# Patient Record
Sex: MALE | Race: WHITE | NOT HISPANIC OR LATINO | Employment: OTHER | URBAN - METROPOLITAN AREA
[De-identification: names, ages, dates, MRNs, and addresses within clinical notes are randomized per-mention and may not be internally consistent; named-entity substitution may affect disease eponyms.]

---

## 2017-03-08 ENCOUNTER — TELEPHONE (OUTPATIENT)
Dept: FAMILY MEDICINE | Facility: CLINIC | Age: 72
End: 2017-03-08

## 2017-03-08 NOTE — TELEPHONE ENCOUNTER
----- Message from Caitlyn Khan sent at 3/7/2017  9:07 AM CST -----  Contact: self  Patient called stating that a prescription for Xarelto 20 mg was suppose to be called to 2 week ago when he was with [facility]    Please call  or cell  to advise.     Thanks

## 2017-03-08 NOTE — TELEPHONE ENCOUNTER
----- Message from Lisbeth Hawk sent at 3/8/2017 12:26 PM CST -----  Pt called stated that he seen the Dr and he changed the blood thinner medication Xarelto 20 mg and stated the pharmacy does not have a enough info/[facility]/pls call back at [phone] /pt stated that this is his 4 message and has not heard from anyone

## 2017-03-09 NOTE — TELEPHONE ENCOUNTER
----- Message from Addie Langston sent at 3/9/2017  9:54 AM CST -----  Contact: Kaiden Livingston samples were given to him. He is wanting to get new script but the pharmacy need authorization. He said he is almost out and put in about 3 or 4 requests. His mail order takes about 4 days.  I advised him well are working on it. Call [phone] on cell or home [phone] when it is done so he will not call back.    [facility] - [address]  [address]  [facility] [address]  Phone: [phone] Fax: [phone]

## 2017-03-10 NOTE — TELEPHONE ENCOUNTER
[employer] DENIED COVERAGE FOR XARELTO, PRESCRIPTION PHONED INTO [facility], PT VERBALIZES UNDERSTANDING

## 2017-03-10 NOTE — TELEPHONE ENCOUNTER
----- Message from Courtney Iglesias sent at 3/10/2017 10:38 AM CST -----  Contact: self  [facility] not been able to get a [facility] for Xarelto. Patient states he will be 5 days short before his appointment on 03/28/17.  Please call [facility] at [phone]. Please call patient at [phone] or [phone]. Thanks!      [facility] - [address]  [address]  [facility] [address]  Phone: [phone] Fax: [phone]

## 2017-03-14 ENCOUNTER — TELEPHONE (OUTPATIENT)
Dept: CARDIOLOGY | Facility: CLINIC | Age: 72
End: 2017-03-14

## 2017-03-16 ENCOUNTER — TELEPHONE (OUTPATIENT)
Dept: CARDIOLOGY | Facility: CLINIC | Age: 72
End: 2017-03-16

## 2017-03-16 NOTE — TELEPHONE ENCOUNTER
Prescription in appeals, should be filled by early next week, pt verbalizes understanding, pt given direct contact info for [facility].

## 2017-03-16 NOTE — TELEPHONE ENCOUNTER
----- Message from Courtney Iglesias sent at 3/16/2017 11:32 AM CDT -----  Contact: self  Patient is calling back regarding his prescription of Xarelto. Insurance is refusing to pay for medication and he is almost out of the samples. Please call patient at [phone] or [phone]. Thanks!

## 2017-03-23 ENCOUNTER — OFFICE VISIT (OUTPATIENT)
Dept: CARDIOLOGY | Facility: CLINIC | Age: 72
End: 2017-03-23
Payer: MEDICARE

## 2017-03-23 VITALS
HEART RATE: 65 BPM | SYSTOLIC BLOOD PRESSURE: 116 MMHG | HEIGHT: 68 IN | WEIGHT: 201.94 LBS | BODY MASS INDEX: 30.61 KG/M2 | DIASTOLIC BLOOD PRESSURE: 65 MMHG

## 2017-03-23 DIAGNOSIS — I48.19 PERSISTENT ATRIAL FIBRILLATION: Primary | ICD-10-CM

## 2017-03-23 DIAGNOSIS — I50.1 LEFT HEART FAILURE: ICD-10-CM

## 2017-03-23 PROBLEM — I25.810 CORONARY ARTERY DISEASE INVOLVING CORONARY BYPASS GRAFT OF NATIVE HEART WITHOUT ANGINA PECTORIS: Status: ACTIVE | Noted: 2017-03-23

## 2017-03-23 PROBLEM — I05.9 MITRAL VALVE DISEASE: Status: ACTIVE | Noted: 2017-03-23

## 2017-03-23 PROBLEM — I66.9: Status: ACTIVE | Noted: 2017-03-23

## 2017-03-23 PROCEDURE — 99999 PR PBB SHADOW E&M-EST. PATIENT-LVL III: CPT | Mod: PBBFAC,,,

## 2017-03-23 PROCEDURE — 99202 OFFICE O/P NEW SF 15 MIN: CPT | Mod: S$PBB,,, | Performed by: INTERNAL MEDICINE

## 2017-03-23 PROCEDURE — 99213 OFFICE O/P EST LOW 20 MIN: CPT | Mod: PBBFAC,PO | Performed by: INTERNAL MEDICINE

## 2017-03-23 RX ORDER — ASPIRIN 81 MG/1
81 TABLET ORAL NIGHTLY
Status: ON HOLD | COMMUNITY
End: 2018-01-18 | Stop reason: HOSPADM

## 2017-03-23 NOTE — PROGRESS NOTES
Subjective:    Patient ID:  Kaiden Forde is a 71 y.o. male who presents for Atrial Fibrillation and Congestive Heart Failure  CAD    HPI DISCUSSED LABS, CR 1.20. , DOING OK, NO TACHYCARDIA, SEE ROS    Past Medical History:   Diagnosis Date    Acute coronary syndrome     Atrial fibrillation     Cardiomyopathy     Carotid artery occlusion     CHF (congestive heart failure)     Coronary artery disease     Heart murmur     Hyperlipidemia     Hypertension     Hypothyroidism     Sleep apnea     Valvular regurgitation      Past Surgical History:   Procedure Laterality Date    CARDIAC CATHETERIZATION      CAROTID ENDARTERECTOMY      CORONARY ANGIOPLASTY      CORONARY ARTERY BYPASS GRAFT      CORONARY STENT PLACEMENT      KNEE SURGERY      SHOULDER SURGERY Left      Family History   Problem Relation Age of Onset    Heart disease Brother      Social History     Social History    Marital status:      Spouse name: N/A    Number of children: N/A    Years of education: N/A     Social History Main Topics    Smoking status: Former Smoker     Quit date: 3/11/2001    Smokeless tobacco: Never Used    Alcohol use No    Drug use: No    Sexual activity: No     Other Topics Concern    None     Social History Narrative       Review of patient's allergies indicates:  No Known Allergies    Current Outpatient Prescriptions:     aspirin (ECOTRIN) 81 MG EC tablet, Take 81 mg by mouth once daily., Disp: , Rfl:     atorvastatin (LIPITOR) 80 MG tablet, Take 1 tablet by mouth once daily., Disp: , Rfl:     benazepril (LOTENSIN) 20 MG tablet, Take 1 tablet by mouth once daily., Disp: , Rfl:     carvedilol (COREG) 12.5 MG tablet, , Disp: , Rfl:     furosemide (LASIX) 40 MG tablet, Take 40 mg by mouth 2 (two) times daily., Disp: , Rfl:     isosorbide mononitrate (IMDUR) 30 MG 24 hr tablet, Take 1 tablet by mouth once daily., Disp: , Rfl:     levothyroxine (SYNTHROID) 50 MCG tablet, Take 1 tablet by  mouth once daily, Disp: 90 tablet, Rfl: 1    metformin (GLUCOPHAGE) 1000 MG tablet, Take 1 tablet by mouth two  times daily, Disp: 180 tablet, Rfl: 1    mv-min-folic acid-lutein (CENTRUM SILVER) 400-250 mcg Chew, Take by mouth., Disp: , Rfl:     nitroGLYCERIN 0.2 mg/hr TD PT24 (NITRODUR) 0.2 mg/hr, , Disp: , Rfl:     omeprazole (PRILOSEC) 20 MG capsule, Take 20 mg by mouth once daily., Disp: , Rfl:     PANTOTHENIC ACID ORAL, Take 1 tablet by mouth., Disp: , Rfl:     potassium chloride (KLOR-CON) 10 MEQ TbSR, Take 1 tablet by mouth once daily., Disp: , Rfl:     rivaroxaban (XARELTO) 20 mg Tab, Take 1 tablet (20 mg total) by mouth once daily., Disp: 180 tablet, Rfl: 1    zinc 50 mg Tab, Take 1 tablet by mouth., Disp: , Rfl:     Review of Systems   Constitution: Negative for chills, diaphoresis, weakness, malaise/fatigue, night sweats and weight gain.   HENT: Negative for congestion.    Eyes: Negative for blurred vision, discharge and visual disturbance.   Cardiovascular: Positive for irregular heartbeat. Negative for chest pain, claudication, cyanosis, leg swelling, near-syncope, orthopnea, palpitations, paroxysmal nocturnal dyspnea and syncope. Dyspnea on exertion: MILD.   Respiratory: Negative for cough, hemoptysis, shortness of breath, sleep disturbances due to breathing, sputum production and wheezing.    Endocrine: Negative for cold intolerance, heat intolerance, polyphagia and polyuria.   Hematologic/Lymphatic: Negative for adenopathy. Does not bruise/bleed easily.   Skin: Negative for color change, itching and nail changes.   Musculoskeletal: Positive for stiffness. Negative for back pain and falls.   Gastrointestinal: Negative for bloating, abdominal pain, constipation, dysphagia, flatus, heartburn, hematemesis, jaundice and melena.   Genitourinary: Negative for dysuria, frequency, hematuria, incomplete emptying and nocturia.   Neurological: Negative for brief paralysis, difficulty with concentration,  "dizziness, focal weakness, light-headedness, loss of balance, numbness, paresthesias and tremors.   Psychiatric/Behavioral: Negative for altered mental status and substance abuse. The patient is not nervous/anxious.    Allergic/Immunologic: Negative for persistent infections.        Objective:      Vitals:    03/23/17 1408   BP: 116/65   Pulse: 65   Weight: 91.6 kg (201 lb 15.1 oz)   Height: 5' 8" (1.727 m)     Body mass index is 30.71 kg/(m^2).    Physical Exam   Constitutional: He is oriented to person, place, and time. He appears well-developed and well-nourished.   OVERWEIGHT   HENT:   Head: Normocephalic and atraumatic.   Mouth/Throat: Oropharynx is clear and moist.   Eyes: EOM are normal. Pupils are equal, round, and reactive to light.   Neck: Neck supple. Normal carotid pulses and no hepatojugular reflux present. Carotid bruit is present. No tracheal deviation present. No thyromegaly present.   Cardiovascular: Normal rate.  An irregularly irregular rhythm present. Exam reveals no gallop, no distant heart sounds, no friction rub and no midsystolic click.    Murmur heard.  High-pitched blowing holosystolic murmur is present with a grade of 2/6  at the apex   Early diastolic murmur is present   Pulses:       Carotid pulses are 2+ on the right side, and 2+ on the left side.       Radial pulses are 2+ on the right side, and 2+ on the left side.        Femoral pulses are 2+ on the right side, and 2+ on the left side.       Popliteal pulses are 2+ on the right side, and 2+ on the left side.        Dorsalis pedis pulses are 2+ on the right side, and 2+ on the left side.        Posterior tibial pulses are 2+ on the right side, and 2+ on the left side.   Pulmonary/Chest: Effort normal and breath sounds normal.   STERNOTOMY SCAR   Abdominal: Soft. Bowel sounds are normal. He exhibits no distension and no mass. There is no hepatosplenomegaly. There is no tenderness. There is no CVA tenderness.   Musculoskeletal: Normal " range of motion. He exhibits no edema.   Lymphadenopathy:     He has no cervical adenopathy.   Neurological: He is alert and oriented to person, place, and time. He displays no tremor. No cranial nerve deficit. Coordination normal.   Skin: Skin is warm and dry. No erythema.   Psychiatric: He has a normal mood and affect. His speech is normal and behavior is normal. Judgment and thought content normal.               ..    Chemistry    No results found for: NA, K, CL, CO2, BUN, CREATININE, GLU No results found for: CALCIUM, ALKPHOS, AST, ALT, BILITOT         ..No results found for: CHOL  No results found for: HDL  No results found for: LDLCALC  No results found for: TRIG  No results found for: CHOLHDL  ..No results found for: WBC, HGB, HCT, MCV, PLT    Test(s) Reviewed  I have reviewed the following in detail:  [] Stress test   [] Angiography   [] Echocardiogram   [x] Labs   [] Other:       Assessment:         ICD-10-CM ICD-9-CM   1. Persistent atrial fibrillation I48.1 427.31   2. Left heart failure I50.1 428.1     Problem List Items Addressed This Visit        Cardiology Problems    Persistent atrial fibrillation - Primary    Left heart failure           Plan:         DC PLAVIX, STARTED XARELTO, HR APPEARS OK, WILL CHECK HOLTER WITH AMBULATION, DIET, DAILY WEIGHT, EDUCATION,NO AMNGINA, NO TIA, RTC IN FEW WEEKS    Persistent atrial fibrillation    Left heart failure  RTC Low level/low impact aerobic exercise 5x's/wk. Heart healthy diet and risk factor modification.    See labs and med orders.    Aerobic exercise 5x's/wk. Heart healthy diet and risk factor modification.    See labs and med orders.

## 2017-03-23 NOTE — MR AVS SNAPSHOT
[facility]  [address] [facility] [address]  [place] [address]  Phone: [phone]                  Kaiden Forde   3/23/2017 2:00 PM   Office Visit    Description:  Male : 1945   Provider:  Yuri Toure MD   Department:  [facility]           Reason for Visit     Atrial Fibrillation     Congestive Heart Failure           Diagnoses this Visit        Comments    Persistent atrial fibrillation    -  Primary     Left heart failure                To Do List           Goals (5 Years of Data)     None      [facility]     [facility] On Call Nurse Care Line -  Assistance  Registered nurses in the [facility] On Call Center provide clinical advisement, health education, appointment booking, and other advisory services.  Call for this free service at [phone].             Medications           Message regarding Medications     Verify the changes and/or additions to your medication regime listed below are the same as discussed with your clinician today.  If any of these changes or additions are incorrect, please notify your healthcare provider.        STOP taking these medications     ZETIA 10 mg tablet Take 1 tablet by mouth once daily.    clopidogrel (PLAVIX) 75 mg tablet Take 1 tablet by mouth once daily.           Verify that the below list of medications is an accurate representation of the medications you are currently taking.  If none reported, the list may be blank. If incorrect, please contact your healthcare provider. Carry this list with you in case of emergency.           Current Medications     aspirin (ECOTRIN) 81 MG EC tablet Take 81 mg by mouth once daily.    atorvastatin (LIPITOR) 80 MG tablet Take 1 tablet by mouth once daily.    benazepril (LOTENSIN) 20 MG tablet Take 1 tablet by mouth once daily.    carvedilol (COREG) 12.5 MG tablet     furosemide (LASIX) 40 MG tablet Take 40 mg by mouth 2 (two) times daily.    isosorbide mononitrate (IMDUR) 30 MG 24 hr tablet Take  "1 tablet by mouth once daily.    levothyroxine (SYNTHROID) 50 MCG tablet Take 1 tablet by mouth once daily    metformin (GLUCOPHAGE) 1000 MG tablet Take 1 tablet by mouth two  times daily    mv-min-folic acid-lutein (CENTRUM SILVER) 400-250 mcg Chew Take by mouth.    nitroGLYCERIN 0.2 mg/hr TD PT24 (NITRODUR) 0.2 mg/hr     omeprazole (PRILOSEC) 20 MG capsule Take 20 mg by mouth once daily.    PANTOTHENIC ACID ORAL Take 1 tablet by mouth.    potassium chloride (KLOR-CON) 10 MEQ TbSR Take 1 tablet by mouth once daily.    rivaroxaban (XARELTO) 20 mg Tab Take 1 tablet (20 mg total) by mouth once daily.    zinc 50 mg Tab Take 1 tablet by mouth.           Clinical Reference Information           Your Vitals Were     BP Pulse Height Weight BMI    116/65 (BP Location: Left arm, Patient Position: Sitting, BP Method: Automatic) 65 5' 8" (1.727 m) 91.6 kg (201 lb 15.1 oz) 30.71 kg/m2      Blood Pressure          Most Recent Value    BP  116/65      Allergies as of 3/23/2017     No Known Allergies      Immunizations Administered on Date of Encounter - 3/23/2017     None      [facility] Sign-Up     Activating your [facility] account is as easy as 1-2-3!     1) Visit [email or web address], select Sign Up Now, enter this activation code and your date of birth, then select Next.  [email or web address]  Expires: 5/7/2017  2:37 PM      2) Create a username and password to use when you visit [facility] in the future and select a security question in case you lose your password and select Next.    3) Enter your e-mail address and click Sign Up!    Additional Information  If you have questions, please e-mail [email or web address] or call [phone] to talk to our [facility] staff. Remember, [facility] is NOT to be used for urgent needs. For medical emergencies, dial 911.         Instructions      Understanding Atrial Fibrillation    An arrhythmia is any problem with the speed or pattern of the heartbeat. Atrial fibrillation (AFib) is a common type of " arrhythmia. It causes fast, chaotic electrical signals in the atria. This leads to poor functioning of the heart. It also affects how much blood your heart can pump out to the body.  Afib may occur once in a while and go away on its own. Or it may continue for longer periods and need treatment.  AFib can lead to serious problems, such as stroke. Your healthcare provider will need to monitor and manage it.  What happens during atrial fibrillation?   The heart has an electrical system that sends signals to control the heartbeat. As signals move through the heart, they tell the hearts upper chambers (atria) and lower chambers (ventricles) when to squeeze (contract) and relax. This lets blood move through the heart and out to the body and lungs.  With AFib, the atria receive abnormal signals. This causes them to contract in a fast and irregular way, and out of sync with the ventricles. When this happens, the atria also have a harder time moving blood into the ventricles. Blood may then pool in the atria, which increases the risk for blood clots and stroke. The ventricles also may contract too quickly and irregularly. As a result, they may not pump blood to the body and lungs as well as they should. This can weaken the heart muscle over time and cause heart failure.  What causes atrial fibrillation?  AFib is more common in older adults. It has many possible causes including:  · Coronary artery disease  · Heart valve disease  · Heart attack  · Heart surgery  · High blood pressure  · Thyroid disease  · Diabetes  · Lung disease  · Sleep apnea  · Heavy alcohol use  In some cases of AFib, doctors do not know the cause.  What are the symptoms of atrial fibrillation?  AFib may or may not cause symptoms. If symptoms do occur, they may include:  · A fast, pounding, irregular heartbeat  · Shortness of breath  · Tiredness  · Dizziness or fainting  · Chest pain  How is atrial fibrillation treated?  Treatments for AFib can include  any of the options below.  · Medicines. You may be prescribed:  ¨ Heart rate medicines to help slow down the heartbeat  ¨ Heart rhythm medicines to help the heart beat more regularly  ¨ Anti-clotting medicines to help reduce the risk for blood clots and stroke  · Electrical cardioversion. Your healthcare provider uses special pads or paddles to send one or more brief electrical shocks to the heart. This can help reset the heartbeat to normal.  · Ablation. Long, thin tubes called catheters are threaded through a blood vessel to the heart. There, the catheters send out hot or cold energy to the areas causing the abnormal signals. This energy destroys the problem tissue or cells. This improves the chances that your heart will stay in normal rhythm without using medicines. If your heart rate and rhythm cant be controlled, you may need ablation and a pacemaker. These will help control the heart rate and regularity of the heartbeat.  · Surgery. During surgery, your healthcare provider may use different methods to create scar tissue in the areas of the heart causing the abnormal signals. The scar tissue disrupts the abnormal signals and may stop AFib from occurring.  What are the complications of atrial fibrillation?  These can include:  · Blood clots  · Stroke  · Heart failure. This problem occurs when the heart muscle weakens so much that it can no longer pump blood well.  When should I call my healthcare provider?  Call your healthcare provider right away if you have any of these:  · Symptoms that dont get better with treatment, or get worse  · New symptoms   Date Last Reviewed: 5/1/2016  © 1020-0986 [employer]. [address] [place] [address], [place] [address]. All rights reserved. This information is not intended as a substitute for professional medical care. Always follow your healthcare professional's instructions.        Left-Sided Congestive Heart Failure    The heart is a large muscle that pumps blood  throughout the body. Blood carries oxygen to all the organs (including the brain), muscles, and skin of your body. After the body takes the oxygen out of the blood, the blood returns to the heart. The right side of the heart collects that blood and pumps it to the lungs to receive fresh oxygen. This oxygen-rich blood from the lungs then returns to the left side of the heart, where it is pumped back out to the brain and rest of the body, starting the process all over.   Heart failure occurs when the heart muscle is weakened. This can occur after a heart attack or with significant coronary artery disease. This affects the pumping action of the heart.   When the left side of the heart is weakened, it cant handle the blood it is getting from the lungs. Pressure then builds up in the veins of the lungs, causing fluid to leak into the lung tissues. This may be referred to as congestive heart failure. This causes you to feel short of breath, weak, or dizzy. These symptoms are often worse with exertion, such as climbing stairs or walking up hills. Lying flat is uncomfortable and can make your breathing worse. This may make sleeping difficult and force you to use extra pillows to elevate your upper body to help you sleep well.  Causes of heart failure include:  · Coronary artery disease  · Heart attack in the past (also known as acute myocardial infarction, or AMI)  · High blood pressure  · Damaged heart valve  · Diabetes  · Obesity  · Cigarette smoking  · Alcohol abuse  Treatment  Heart failure is a chronic condition. There is no cure. The purpose of medical treatment is to improve the pumping action of the heart, and remove excess water and fluids from the body. A number of medicines can help reach this goal, improve symptoms and keep the heart from becoming weaker. In some cases of severe heart failure, a mechanical device will be placed in the heart to help the heart pump. Another major goal is to better treat the  causes of heart failure, such as diabetes, high blood pressure, and your lifestyle.  Home care  · Check your weight every day. A sudden increase in weight gain could mean worsening heart failure.  ¨ Use the same scale every day.  ¨ Weigh yourself at the same time every day.  ¨ Make sure the scale is on the floor, not on a rug.  ¨ Keep a record of your weight every day, so your healthcare provider can see it. If you are not given a log sheet for this, keep a separate journal for this purpose.   · Cut back on how much salt (sodium) you eat:  ¨ Your provider will tell you how much salt to have daily, usually 2,000 mg or less.  ¨ Avoid high-salt foods. These include olives, pickles, smoked meats, processed foods, and salted potato chips.  ¨ Don't add salt to your food at the table. Use only small amounts of salt when cooking.  ¨ Avoid binging on salt-heavy meals.  · Follow your healthcare provider's recommendations about how much fluid you should have.  · Stop smoking.  · Cut back on the amount of alcohol you drink.  · Lose weight if you are overweight. The excess weight adds a lot of stress on the workload of the heart.  · Stay active. Talk to your provider about an exercise program that is safe for your heart.  · Keep your feet elevated to reduce swelling. Ask your provider about support hose as a preventive treatment for daytime leg swelling.  · Follow your healthcare provider's instructions closely.  Besides taking your medicine as instructed, an important part of treatment includes lifestyle changes. These include diet, physical activity, stopping smoking, and weight control.  Improve your diet. Often in the hospital, people are given a heart healthy diet. This includes more fresh foods, lower saturated fat, less processed foods, and lower salt.  Follow-up care  Follow up with your healthcare provider, or as advised. Make sure to keep any appointments that were made for you. This can help better control heart  failure.  If an X-ray was done, you will be told of any new findings that may affect your care.  Call 911  Call 911 if you:  · Become severely short of breath  · Feel lightheaded, or feel like you might pass out or faint  · Have chest pain or discomfort that is different than usual, the medicines your provider told you to use for this don't help, or the pain lasts longer than 10 to 15 minutes  · Develop a rapid heart rate suddenly  When to seek medical advice  Call your healthcare provider right away if you have any of these signs of worsening heart failure:  · Sudden weight gain --  more than 2 pounds in 1 day, or 5 pounds in 1 week, or whatever weight gain you were told to report by your provider  · Trouble breathing not related to being active  · New or increased swelling of your legs or ankles  · Swelling or pain in your abdomen  · Breathing trouble at night, waking up short of breath or needing more pillows to elevate your upper body to help you breathe  · Frequent coughing that doesnt go away  · Feeling much more tired than usual  Date Last Reviewed: 1/4/2016  © 3082-3755 [employer]. [address] [place] [address]. All rights reserved. This information is not intended as a substitute for professional medical care. Always follow your healthcare professional's instructions.             Language Assistance Services     ATTENTION: Language assistance services are available, free of charge. Please call [phone].      ATENCIÓN: Si habla español, tiene a mar disposición servicios gratuitos de asistencia lingüística. Llame al [phone].     [facility] Ý: N?u b?n nói Ti?ng Vi?t, có các d?ch v? h? tr? ngôn ng? mi?n phí dành cho b?n. G?i s? [phone].         [facility] complies with applicable Federal civil rights laws and does not discriminate on the basis of race, color, national origin, age, disability, or sex.

## 2017-03-23 NOTE — PATIENT INSTRUCTIONS
Understanding Atrial Fibrillation    An arrhythmia is any problem with the speed or pattern of the heartbeat. Atrial fibrillation (AFib) is a common type of arrhythmia. It causes fast, chaotic electrical signals in the atria. This leads to poor functioning of the heart. It also affects how much blood your heart can pump out to the body.  Afib may occur once in a while and go away on its own. Or it may continue for longer periods and need treatment.  AFib can lead to serious problems, such as stroke. Your healthcare provider will need to monitor and manage it.  What happens during atrial fibrillation?   The heart has an electrical system that sends signals to control the heartbeat. As signals move through the heart, they tell the hearts upper chambers (atria) and lower chambers (ventricles) when to squeeze (contract) and relax. This lets blood move through the heart and out to the body and lungs.  With AFib, the atria receive abnormal signals. This causes them to contract in a fast and irregular way, and out of sync with the ventricles. When this happens, the atria also have a harder time moving blood into the ventricles. Blood may then pool in the atria, which increases the risk for blood clots and stroke. The ventricles also may contract too quickly and irregularly. As a result, they may not pump blood to the body and lungs as well as they should. This can weaken the heart muscle over time and cause heart failure.  What causes atrial fibrillation?  AFib is more common in older adults. It has many possible causes including:  · Coronary artery disease  · Heart valve disease  · Heart attack  · Heart surgery  · High blood pressure  · Thyroid disease  · Diabetes  · Lung disease  · Sleep apnea  · Heavy alcohol use  In some cases of AFib, doctors do not know the cause.  What are the symptoms of atrial fibrillation?  AFib may or may not cause symptoms. If symptoms do occur, they may include:  · A fast, pounding,  irregular heartbeat  · Shortness of breath  · Tiredness  · Dizziness or fainting  · Chest pain  How is atrial fibrillation treated?  Treatments for AFib can include any of the options below.  · Medicines. You may be prescribed:  ¨ Heart rate medicines to help slow down the heartbeat  ¨ Heart rhythm medicines to help the heart beat more regularly  ¨ Anti-clotting medicines to help reduce the risk for blood clots and stroke  · Electrical cardioversion. Your healthcare provider uses special pads or paddles to send one or more brief electrical shocks to the heart. This can help reset the heartbeat to normal.  · Ablation. Long, thin tubes called catheters are threaded through a blood vessel to the heart. There, the catheters send out hot or cold energy to the areas causing the abnormal signals. This energy destroys the problem tissue or cells. This improves the chances that your heart will stay in normal rhythm without using medicines. If your heart rate and rhythm cant be controlled, you may need ablation and a pacemaker. These will help control the heart rate and regularity of the heartbeat.  · Surgery. During surgery, your healthcare provider may use different methods to create scar tissue in the areas of the heart causing the abnormal signals. The scar tissue disrupts the abnormal signals and may stop AFib from occurring.  What are the complications of atrial fibrillation?  These can include:  · Blood clots  · Stroke  · Heart failure. This problem occurs when the heart muscle weakens so much that it can no longer pump blood well.  When should I call my healthcare provider?  Call your healthcare provider right away if you have any of these:  · Symptoms that dont get better with treatment, or get worse  · New symptoms   Date Last Reviewed: 5/1/2016  © 9614-9535 [employer]. [address] [place] [address], [place] [address]. All rights reserved. This information is not intended as a substitute for professional  medical care. Always follow your healthcare professional's instructions.        Left-Sided Congestive Heart Failure    The heart is a large muscle that pumps blood throughout the body. Blood carries oxygen to all the organs (including the brain), muscles, and skin of your body. After the body takes the oxygen out of the blood, the blood returns to the heart. The right side of the heart collects that blood and pumps it to the lungs to receive fresh oxygen. This oxygen-rich blood from the lungs then returns to the left side of the heart, where it is pumped back out to the brain and rest of the body, starting the process all over.   Heart failure occurs when the heart muscle is weakened. This can occur after a heart attack or with significant coronary artery disease. This affects the pumping action of the heart.   When the left side of the heart is weakened, it cant handle the blood it is getting from the lungs. Pressure then builds up in the veins of the lungs, causing fluid to leak into the lung tissues. This may be referred to as congestive heart failure. This causes you to feel short of breath, weak, or dizzy. These symptoms are often worse with exertion, such as climbing stairs or walking up hills. Lying flat is uncomfortable and can make your breathing worse. This may make sleeping difficult and force you to use extra pillows to elevate your upper body to help you sleep well.  Causes of heart failure include:  · Coronary artery disease  · Heart attack in the past (also known as acute myocardial infarction, or AMI)  · High blood pressure  · Damaged heart valve  · Diabetes  · Obesity  · Cigarette smoking  · Alcohol abuse  Treatment  Heart failure is a chronic condition. There is no cure. The purpose of medical treatment is to improve the pumping action of the heart, and remove excess water and fluids from the body. A number of medicines can help reach this goal, improve symptoms and keep the heart from becoming  weaker. In some cases of severe heart failure, a mechanical device will be placed in the heart to help the heart pump. Another major goal is to better treat the causes of heart failure, such as diabetes, high blood pressure, and your lifestyle.  Home care  · Check your weight every day. A sudden increase in weight gain could mean worsening heart failure.  ¨ Use the same scale every day.  ¨ Weigh yourself at the same time every day.  ¨ Make sure the scale is on the floor, not on a rug.  ¨ Keep a record of your weight every day, so your healthcare provider can see it. If you are not given a log sheet for this, keep a separate journal for this purpose.   · Cut back on how much salt (sodium) you eat:  ¨ Your provider will tell you how much salt to have daily, usually 2,000 mg or less.  ¨ Avoid high-salt foods. These include olives, pickles, smoked meats, processed foods, and salted potato chips.  ¨ Don't add salt to your food at the table. Use only small amounts of salt when cooking.  ¨ Avoid binging on salt-heavy meals.  · Follow your healthcare provider's recommendations about how much fluid you should have.  · Stop smoking.  · Cut back on the amount of alcohol you drink.  · Lose weight if you are overweight. The excess weight adds a lot of stress on the workload of the heart.  · Stay active. Talk to your provider about an exercise program that is safe for your heart.  · Keep your feet elevated to reduce swelling. Ask your provider about support hose as a preventive treatment for daytime leg swelling.  · Follow your healthcare provider's instructions closely.  Besides taking your medicine as instructed, an important part of treatment includes lifestyle changes. These include diet, physical activity, stopping smoking, and weight control.  Improve your diet. Often in the hospital, people are given a heart healthy diet. This includes more fresh foods, lower saturated fat, less processed foods, and lower salt.  Follow-up  care  Follow up with your healthcare provider, or as advised. Make sure to keep any appointments that were made for you. This can help better control heart failure.  If an X-ray was done, you will be told of any new findings that may affect your care.  Call 911  Call 911 if you:  · Become severely short of breath  · Feel lightheaded, or feel like you might pass out or faint  · Have chest pain or discomfort that is different than usual, the medicines your provider told you to use for this don't help, or the pain lasts longer than 10 to 15 minutes  · Develop a rapid heart rate suddenly  When to seek medical advice  Call your healthcare provider right away if you have any of these signs of worsening heart failure:  · Sudden weight gain --  more than 2 pounds in 1 day, or 5 pounds in 1 week, or whatever weight gain you were told to report by your provider  · Trouble breathing not related to being active  · New or increased swelling of your legs or ankles  · Swelling or pain in your abdomen  · Breathing trouble at night, waking up short of breath or needing more pillows to elevate your upper body to help you breathe  · Frequent coughing that doesnt go away  · Feeling much more tired than usual  Date Last Reviewed: 1/4/2016  © 5441-6341 [employer] [address] [place] [address], [place] [address]. All rights reserved. This information is not intended as a substitute for professional medical care. Always follow your healthcare professional's instructions.

## 2017-03-24 ENCOUNTER — TELEPHONE (OUTPATIENT)
Dept: CARDIOLOGY | Facility: CLINIC | Age: 72
End: 2017-03-24

## 2017-03-24 NOTE — TELEPHONE ENCOUNTER
----- Message from Kylie Lopez sent at 3/24/2017  8:11 AM CDT -----  Contact: self  Needs to verify which medication he was taken off of yesterday. Please call back at  or .

## 2017-04-06 RX ORDER — BENAZEPRIL HYDROCHLORIDE 20 MG/1
TABLET ORAL
Qty: 90 TABLET | Refills: 1 | Status: SHIPPED | OUTPATIENT
Start: 2017-04-06 | End: 2017-11-04 | Stop reason: SDUPTHER

## 2017-04-06 RX ORDER — POTASSIUM CHLORIDE 750 MG/1
TABLET, EXTENDED RELEASE ORAL
Qty: 45 TABLET | Refills: 1 | Status: SHIPPED | OUTPATIENT
Start: 2017-04-06 | End: 2017-11-04 | Stop reason: SDUPTHER

## 2017-04-06 RX ORDER — EZETIMIBE 10 MG
TABLET ORAL
Qty: 90 TABLET | Refills: 1 | Status: SHIPPED | OUTPATIENT
Start: 2017-04-06 | End: 2017-04-27

## 2017-04-11 ENCOUNTER — CLINICAL SUPPORT (OUTPATIENT)
Dept: CARDIOLOGY | Facility: CLINIC | Age: 72
End: 2017-04-11
Payer: MEDICARE

## 2017-04-11 DIAGNOSIS — I50.1 LEFT HEART FAILURE: ICD-10-CM

## 2017-04-11 DIAGNOSIS — I48.19 PERSISTENT ATRIAL FIBRILLATION: ICD-10-CM

## 2017-04-11 PROCEDURE — 93226 XTRNL ECG REC<48 HR SCAN A/R: CPT | Mod: PBBFAC,PO | Performed by: INTERNAL MEDICINE

## 2017-04-11 PROCEDURE — 93227 XTRNL ECG REC<48 HR R&I: CPT | Mod: S$PBB,,, | Performed by: INTERNAL MEDICINE

## 2017-04-19 ENCOUNTER — TELEPHONE (OUTPATIENT)
Dept: CARDIOLOGY | Facility: CLINIC | Age: 72
End: 2017-04-19

## 2017-04-19 NOTE — TELEPHONE ENCOUNTER
Pt states that Dr Toure had DC'd Isosorbide this last year, when he increased NTG patch, questioning whether he should still be taking, please advise

## 2017-04-19 NOTE — TELEPHONE ENCOUNTER
----- Message from Beatriz Osuna sent at 4/19/2017  9:09 AM CDT -----  Contact: self  Patient wants to speak with a nurse regarding his medication list. Please call back at [phone] (home)

## 2017-04-27 ENCOUNTER — OFFICE VISIT (OUTPATIENT)
Dept: CARDIOLOGY | Facility: CLINIC | Age: 72
End: 2017-04-27
Payer: MEDICARE

## 2017-04-27 VITALS
HEIGHT: 68 IN | DIASTOLIC BLOOD PRESSURE: 68 MMHG | HEART RATE: 72 BPM | SYSTOLIC BLOOD PRESSURE: 127 MMHG | BODY MASS INDEX: 29.8 KG/M2 | WEIGHT: 196.63 LBS

## 2017-04-27 DIAGNOSIS — I48.19 PERSISTENT ATRIAL FIBRILLATION: Primary | ICD-10-CM

## 2017-04-27 DIAGNOSIS — Z79.01 LONG TERM (CURRENT) USE OF ANTICOAGULANTS: ICD-10-CM

## 2017-04-27 DIAGNOSIS — I25.10 CORONARY ARTERY DISEASE INVOLVING NATIVE CORONARY ARTERY OF NATIVE HEART WITHOUT ANGINA PECTORIS: ICD-10-CM

## 2017-04-27 DIAGNOSIS — I50.1 LEFT HEART FAILURE: ICD-10-CM

## 2017-04-27 PROCEDURE — 99999 PR PBB SHADOW E&M-EST. PATIENT-LVL III: CPT | Mod: PBBFAC,,, | Performed by: INTERNAL MEDICINE

## 2017-04-27 PROCEDURE — 99214 OFFICE O/P EST MOD 30 MIN: CPT | Mod: S$PBB,,, | Performed by: INTERNAL MEDICINE

## 2017-04-27 PROCEDURE — 99213 OFFICE O/P EST LOW 20 MIN: CPT | Mod: PBBFAC,PO | Performed by: INTERNAL MEDICINE

## 2017-04-27 RX ORDER — NITROGLYCERIN 0.4 MG/1
0.4 TABLET SUBLINGUAL EVERY 5 MIN PRN
Qty: 25 TABLET | Refills: 0 | Status: SHIPPED | OUTPATIENT
Start: 2017-04-27 | End: 2018-01-08 | Stop reason: SDUPTHER

## 2017-04-27 RX ORDER — SODIUM CHLORIDE 9 MG/ML
INJECTION, SOLUTION INTRAVENOUS ONCE
Status: CANCELLED | OUTPATIENT
Start: 2017-04-27 | End: 2017-04-27

## 2017-04-27 NOTE — PROGRESS NOTES
Subjective:    Patient ID:  Kaiden Forde is a 71 y.o. male who presents for Follow-up (holter)  a fib    HPI  DISCUSSED HOLTER , A FIB, CONTROLLED RATE, LONGEST RR 2.3 SECONDS, STATES FEELS BAD AT TIMES WITH IRREGULAR HEART BEATS, FEELS BETTER WITH TOMATO JUICE ??, SEE ROS  Past Medical History:   Diagnosis Date    Acute coronary syndrome     Atrial fibrillation     Cardiomyopathy     Carotid artery occlusion     CHF (congestive heart failure)     Coronary artery disease     Heart murmur     Hyperlipidemia     Hypertension     Hypothyroidism     Sleep apnea     Valvular regurgitation      Past Surgical History:   Procedure Laterality Date    CARDIAC CATHETERIZATION      CAROTID ENDARTERECTOMY      CORONARY ANGIOPLASTY      CORONARY ARTERY BYPASS GRAFT      CORONARY STENT PLACEMENT      KNEE SURGERY      SHOULDER SURGERY Left      Family History   Problem Relation Age of Onset    Heart disease Brother      Social History     Social History    Marital status:      Spouse name: N/A    Number of children: N/A    Years of education: N/A     Social History Main Topics    Smoking status: Former Smoker     Quit date: 3/11/2001    Smokeless tobacco: Never Used    Alcohol use No    Drug use: No    Sexual activity: No     Other Topics Concern    Not on file     Social History Narrative       Review of patient's allergies indicates:  No Known Allergies    Current Outpatient Prescriptions:     aspirin (ECOTRIN) 81 MG EC tablet, Take 81 mg by mouth once daily., Disp: , Rfl:     atorvastatin (LIPITOR) 80 MG tablet, Take 1 tablet by mouth once daily., Disp: , Rfl:     benazepril (LOTENSIN) 20 MG tablet, Take 1 tablet by mouth  daily, Disp: 90 tablet, Rfl: 1    carvedilol (COREG) 12.5 MG tablet, , Disp: , Rfl:     furosemide (LASIX) 40 MG tablet, Take 40 mg by mouth 2 (two) times daily., Disp: , Rfl:     levothyroxine (SYNTHROID) 50 MCG tablet, Take 1 tablet by mouth once daily, Disp:  90 tablet, Rfl: 1    metformin (GLUCOPHAGE) 1000 MG tablet, Take 1 tablet by mouth two  times daily, Disp: 180 tablet, Rfl: 1    mv-min-folic acid-lutein (CENTRUM SILVER) 400-250 mcg Chew, Take by mouth., Disp: , Rfl:     nitroGLYCERIN 0.2 mg/hr TD PT24 (NITRODUR) 0.2 mg/hr, , Disp: , Rfl:     potassium chloride (KLOR-CON) 10 MEQ TbSR, Take 1 tablet by mouth  every other day, Disp: 45 tablet, Rfl: 1    rivaroxaban (XARELTO) 20 mg Tab, Take 1 tablet (20 mg total) by mouth once daily., Disp: 180 tablet, Rfl: 1    nitroGLYCERIN (NITROSTAT) 0.4 MG SL tablet, Place 1 tablet (0.4 mg total) under the tongue every 5 (five) minutes as needed for Chest pain., Disp: 25 tablet, Rfl: 0    omeprazole (PRILOSEC) 20 MG capsule, Take 20 mg by mouth once daily., Disp: , Rfl:     PANTOTHENIC ACID ORAL, Take 1 tablet by mouth., Disp: , Rfl:     zinc 50 mg Tab, Take 1 tablet by mouth., Disp: , Rfl:     Review of Systems   Constitution: Positive for malaise/fatigue. Negative for chills, decreased appetite, diaphoresis, fever, weakness, night sweats and weight gain. Weight loss: MILD.   HENT: Negative for congestion. Nosebleeds: RARE/WITH DRY AIR/ CPAP.    Eyes: Negative for blurred vision, discharge and visual disturbance.   Cardiovascular: Positive for irregular heartbeat and palpitations. Negative for chest pain, claudication, cyanosis, leg swelling, near-syncope, orthopnea, paroxysmal nocturnal dyspnea and syncope. Dyspnea on exertion: MILD.   Respiratory: Negative for cough, hemoptysis, sleep disturbances due to breathing, sputum production and wheezing.    Endocrine: Negative for cold intolerance, heat intolerance, polydipsia, polyphagia and polyuria.   Hematologic/Lymphatic: Negative for adenopathy and bleeding problem. Does not bruise/bleed easily.   Skin: Negative for color change, itching and nail changes.   Musculoskeletal: Negative for back pain and falls.   Gastrointestinal: Negative for bloating, abdominal pain,  "anorexia, change in bowel habit, dysphagia, flatus, heartburn, hematemesis, jaundice, melena, nausea and vomiting.   Genitourinary: Negative for dysuria, frequency, hematuria and incomplete emptying. Nocturia: ONCE.   Neurological: Negative for brief paralysis, difficulty with concentration, dizziness, focal weakness, light-headedness, loss of balance, numbness, paresthesias and tremors.   Psychiatric/Behavioral: Negative for altered mental status and substance abuse. The patient is not nervous/anxious. Insomnia: MILD.    Allergic/Immunologic: Negative for persistent infections.        Objective:      Vitals:    04/27/17 1301   BP: 127/68   Pulse: 72   Weight: 89.2 kg (196 lb 10.4 oz)   Height: 5' 8" (1.727 m)   PainSc: 0-No pain     Body mass index is 29.9 kg/(m^2).    Physical Exam   Constitutional: He is oriented to person, place, and time. He appears well-developed and well-nourished.   OVERWEIGHT   HENT:   Head: Normocephalic and atraumatic.   Mouth/Throat: Oropharynx is clear and moist.   Eyes: Conjunctivae and EOM are normal. Pupils are equal, round, and reactive to light.   Neck: Neck supple. Normal carotid pulses and no hepatojugular reflux present. Carotid bruit is present. No tracheal deviation present. No thyromegaly present.   Cardiovascular: Normal rate.  An irregularly irregular rhythm present. Exam reveals no gallop, no distant heart sounds, no friction rub and no midsystolic click.      High-pitched blowing holosystolic murmur is present  at the apex   Early diastolic murmur is present   Pulses:       Carotid pulses are 2+ on the right side with bruit, and 2+ on the left side with bruit.       Radial pulses are 2+ on the right side, and 2+ on the left side.        Femoral pulses are 2+ on the right side, and 2+ on the left side.       Popliteal pulses are 2+ on the right side, and 2+ on the left side.        Dorsalis pedis pulses are 2+ on the right side, and 2+ on the left side.        Posterior " tibial pulses are 2+ on the right side, and 2+ on the left side.   Pulmonary/Chest: Effort normal and breath sounds normal.   STERNOTOMY SCAR   Abdominal: Soft. Bowel sounds are normal. He exhibits no distension and no mass. There is no hepatosplenomegaly. There is no tenderness. There is no CVA tenderness.   Musculoskeletal: Normal range of motion. He exhibits no edema.   Lymphadenopathy:     He has no cervical adenopathy.   Neurological: He is alert and oriented to person, place, and time. He displays no tremor. No cranial nerve deficit. Coordination normal.   Skin: Skin is warm and dry. No bruising noted. No cyanosis or erythema.   Psychiatric: He has a normal mood and affect. His speech is normal and behavior is normal. Judgment and thought content normal. Cognition and memory are normal.               ..    Chemistry    No results found for: NA, K, CL, CO2, BUN, CREATININE, GLU No results found for: CALCIUM, ALKPHOS, AST, ALT, BILITOT         ..No results found for: CHOL  No results found for: HDL  No results found for: LDLCALC  No results found for: TRIG  No results found for: CHOLHDL  ..No results found for: WBC, HGB, HCT, MCV, PLT    Test(s) Reviewed  I have reviewed the following in detail:  [] Stress test   [] Angiography   [] Echocardiogram   [] Labs   [x] Other:       Assessment:         ICD-10-CM ICD-9-CM   1. Persistent atrial fibrillation I48.1 427.31   2. Long term (current) use of anticoagulants Z79.01 V58.61   3. Coronary artery disease involving native coronary artery of native heart without angina pectoris I25.10 414.01   4. Left heart failure I50.1 428.1     Problem List Items Addressed This Visit        Cardiology Problems    Coronary artery disease involving native coronary artery of native heart without angina pectoris    Relevant Orders    Case Request-Cath Lab: [facility]ARDI[facility]ERS[facility] (Completed)    Pulse Oximetry Q4H    Persistent atrial fibrillation - Primary    Relevant Orders    Case Request-Cath  Lab: [facility] (Completed)    Pulse Oximetry Q4H    Left heart failure       Other    Long term (current) use of anticoagulants    Relevant Orders    Case Request-Cath Lab: [facility] (Completed)    Pulse Oximetry Q4H           Plan:     MARK/ [facility], FOR SYMPTOMATIC A FIB, CONSENT, DAILY WEIGHT,ALL OTHER CV CLINICALLY STABLE, NO ANGINA, NO HF, NO TIA, CONTINUE CURRENT MEDS, EDUCATION, DIET, EXERCISE, DAILY XARELTO, RTC IN FEW WEEKS      Persistent atrial fibrillation  Comments:  MARK/ [facility]  Orders:  -     Case Request-Cath Lab: [facility]; Standing  -     CARDIOVERSION (DCCV); Standing  -     Place in Outpatient; Standing  -     Vital signs per unit routine; Standing  -     Height and weight; Standing  -     Verify Informed Consent; Standing  -     Smoking Cessation Education; Standing  -     Emergency Equipment at Bedside; Standing  -     Patient to Void on Call Prior to Cardioversion; Standing  -     Clip Chest and Back Hair if Necessary; Standing  -     Insert Peripheral IV (18 Gauge Saline Lock); Standing  -     NOTIFY PHYSICIAN PROC; Standing  -     Diet NPO; Standing  -     Pulse Oximetry Q4H; Standing  -     EKG 12-LEAD; Standing  -     Diet NPO; Standing  -     Protime-INR; Standing  -     Basic metabolic panel; Standing  -     Hematology Profile; Standing  -     EKG 12-lead; Standing    Long term (current) use of anticoagulants  Comments:  DAILY XARELTO  Orders:  -     Case Request-Cath Lab: [facility]; Standing  -     CARDIOVERSION (DCCV); Standing  -     Place in Outpatient; Standing  -     Vital signs per unit routine; Standing  -     Height and weight; Standing  -     Verify Informed Consent; Standing  -     Smoking Cessation Education; Standing  -     Emergency Equipment at Bedside; Standing  -     Patient to Void on Call Prior to Cardioversion; Standing  -     Clip Chest and Back Hair if Necessary; Standing  -     Insert Peripheral IV (18 Gauge Saline Lock); Standing  -      NOTIFY PHYSICIAN PROC; Standing  -     Diet NPO; Standing  -     Pulse Oximetry Q4H; Standing  -     EKG 12-LEAD; Standing  -     Diet NPO; Standing  -     Protime-INR; Standing  -     Basic metabolic panel; Standing  -     Hematology Profile; Standing  -     EKG 12-lead; Standing    Coronary artery disease involving native coronary artery of native heart without angina pectoris  Comments:  STABLE  Orders:  -     Case Request-Cath Lab: [facility]ERS[facility]; Standing  -     CARDIOVERSION (DCCV); Standing  -     Place in Outpatient; Standing  -     Vital signs per unit routine; Standing  -     Height and weight; Standing  -     Verify Informed Consent; Standing  -     Smoking Cessation Education; Standing  -     Emergency Equipment at Bedside; Standing  -     Patient to Void on Call Prior to Cardioversion; Standing  -     Clip Chest and Back Hair if Necessary; Standing  -     Insert Peripheral IV (18 Gauge Saline Lock); Standing  -     NOTIFY PHYSICIAN PROC; Standing  -     Diet NPO; Standing  -     Pulse Oximetry Q4H; Standing  -     EKG 12-LEAD; Standing  -     Diet NPO; Standing  -     Protime-INR; Standing  -     Basic metabolic panel; Standing  -     Hematology Profile; Standing  -     EKG 12-lead; Standing    Left heart failure  Comments:  CLASS 2    Other orders  -     0.9%  NaCl infusion; Inject into the vein once.  -     nitroGLYCERIN (NITROSTAT) 0.4 MG SL tablet; Place 1 tablet (0.4 mg total) under the tongue every 5 (five) minutes as needed for Chest pain.  Dispense: 25 tablet; Refill: 0    RTC Low level/low impact aerobic exercise 5x's/wk. Heart healthy diet and risk factor modification.    See labs and med orders.    Aerobic exercise 5x's/wk. Heart healthy diet and risk factor modification.    See labs and med orders.

## 2017-04-27 NOTE — MR AVS SNAPSHOT
[facility]  [address] [facility] [address]  [facility] [address]  Phone: [phone]                  Kaiden Forde   2017 1:00 PM   Office Visit    Description:  Male : 1945   Provider:  Yuri Toure MD   Department:  [facility]           Reason for Visit     Follow-up           Diagnoses this Visit        Comments    Persistent atrial fibrillation    -  Primary MARK/ CARDIOVERSION    Long term (current) use of anticoagulants     DAILY XARELTO    Coronary artery disease involving native coronary artery of native heart without angina pectoris     STABLE    Left heart failure     CLASS 2           To Do List           Goals (5 Years of Data)     None      Follow-Up and Disposition     Return in about 6 weeks (around 2017).       These Medications        Disp Refills Start End    nitroGLYCERIN (NITROSTAT) 0.4 MG SL tablet 25 tablet 0 2017    Place 1 tablet (0.4 mg total) under the tongue every 5 (five) minutes as needed for Chest pain. - Sublingual    Pharmacy: [facility] - [address] #: [phone]         [facility]     [facility] -  Assistance  Unless otherwise directed by your provider, please contact [facility], our nurse care line that is available for  assistance.     Registered nurses in the [facility] provide: appointment scheduling, clinical advisement, health education, and other advisory services.  Call: [phone] (toll free)               Medications           Message regarding Medications     Verify the changes and/or additions to your medication regime listed below are the same as discussed with your clinician today.  If any of these changes or additions are incorrect, please notify your healthcare provider.        START taking these NEW medications        Refills    nitroGLYCERIN (NITROSTAT) 0.4 MG SL tablet 0    Sig: Place 1 tablet (0.4 mg  "total) under the tongue every 5 (five) minutes as needed for Chest pain.    Class: Normal    Route: Sublingual      STOP taking these medications     ZETIA 10 mg tablet Take 1 tablet by mouth once daily    isosorbide mononitrate (IMDUR) 30 MG 24 hr tablet Take 1 tablet by mouth once daily.           Verify that the below list of medications is an accurate representation of the medications you are currently taking.  If none reported, the list may be blank. If incorrect, please contact your healthcare provider. Carry this list with you in case of emergency.           Current Medications     aspirin (ECOTRIN) 81 MG EC tablet Take 81 mg by mouth once daily.    atorvastatin (LIPITOR) 80 MG tablet Take 1 tablet by mouth once daily.    benazepril (LOTENSIN) 20 MG tablet Take 1 tablet by mouth  daily    carvedilol (COREG) 12.5 MG tablet     furosemide (LASIX) 40 MG tablet Take 40 mg by mouth 2 (two) times daily.    levothyroxine (SYNTHROID) 50 MCG tablet Take 1 tablet by mouth once daily    metformin (GLUCOPHAGE) 1000 MG tablet Take 1 tablet by mouth two  times daily    mv-min-folic acid-lutein (CENTRUM SILVER) 400-250 mcg Chew Take by mouth.    nitroGLYCERIN 0.2 mg/hr TD PT24 (NITRODUR) 0.2 mg/hr     potassium chloride (KLOR-CON) 10 MEQ TbSR Take 1 tablet by mouth  every other day    rivaroxaban (XARELTO) 20 mg Tab Take 1 tablet (20 mg total) by mouth once daily.    nitroGLYCERIN (NITROSTAT) 0.4 MG SL tablet Place 1 tablet (0.4 mg total) under the tongue every 5 (five) minutes as needed for Chest pain.    omeprazole (PRILOSEC) 20 MG capsule Take 20 mg by mouth once daily.    PANTOTHENIC ACID ORAL Take 1 tablet by mouth.    zinc 50 mg Tab Take 1 tablet by mouth.           Clinical Reference Information           Your Vitals Were     BP Pulse Height Weight BMI    127/68 72 5' 8" (1.727 m) 89.2 kg (196 lb 10.4 oz) 29.9 kg/m2      Blood Pressure          Most Recent Value    BP  127/68      Allergies as of 4/27/2017     No " Known Allergies      Immunizations Administered on Date of Encounter - 4/27/2017     None      [facility] Sign-Up     Activating your [facility] account is as easy as 1-2-3!     1) Visit [email or web address], select Sign Up Now, enter this activation code and your date of birth, then select Next.  [email or web address]  Expires: 5/7/2017  2:37 PM      2) Create a username and password to use when you visit [facility] in the future and select a security question in case you lose your password and select Next.    3) Enter your e-mail address and click Sign Up!    Additional Information  If you have questions, please e-mail [email or web address] or call [phone] to talk to our [facility] staff. Remember, [facility] is NOT to be used for urgent needs. For medical emergencies, dial 911.         Instructions      Understanding Atrial Fibrillation    An arrhythmia is any problem with the speed or pattern of the heartbeat. Atrial fibrillation (AFib) is a common type of arrhythmia. It causes fast, chaotic electrical signals in the atria. This leads to poor functioning of the heart. It also affects how much blood your heart can pump out to the body.  Afib may occur once in a while and go away on its own. Or it may continue for longer periods and need treatment.  AFib can lead to serious problems, such as stroke. Your healthcare provider will need to monitor and manage it.  What happens during atrial fibrillation?   The heart has an electrical system that sends signals to control the heartbeat. As signals move through the heart, they tell the hearts upper chambers (atria) and lower chambers (ventricles) when to squeeze (contract) and relax. This lets blood move through the heart and out to the body and lungs.  With AFib, the atria receive abnormal signals. This causes them to contract in a fast and irregular way, and out of sync with the ventricles. When this happens, the atria also have a harder time moving blood into the ventricles.  Blood may then pool in the atria, which increases the risk for blood clots and stroke. The ventricles also may contract too quickly and irregularly. As a result, they may not pump blood to the body and lungs as well as they should. This can weaken the heart muscle over time and cause heart failure.  What causes atrial fibrillation?  AFib is more common in older adults. It has many possible causes including:  · Coronary artery disease  · Heart valve disease  · Heart attack  · Heart surgery  · High blood pressure  · Thyroid disease  · Diabetes  · Lung disease  · Sleep apnea  · Heavy alcohol use  In some cases of AFib, doctors do not know the cause.  What are the symptoms of atrial fibrillation?  AFib may or may not cause symptoms. If symptoms do occur, they may include:  · A fast, pounding, irregular heartbeat  · Shortness of breath  · Tiredness  · Dizziness or fainting  · Chest pain  How is atrial fibrillation treated?  Treatments for AFib can include any of the options below.  · Medicines. You may be prescribed:  ¨ Heart rate medicines to help slow down the heartbeat  ¨ Heart rhythm medicines to help the heart beat more regularly  ¨ Anti-clotting medicines to help reduce the risk for blood clots and stroke  · Electrical cardioversion. Your healthcare provider uses special pads or paddles to send one or more brief electrical shocks to the heart. This can help reset the heartbeat to normal.  · Ablation. Long, thin tubes called catheters are threaded through a blood vessel to the heart. There, the catheters send out hot or cold energy to the areas causing the abnormal signals. This energy destroys the problem tissue or cells. This improves the chances that your heart will stay in normal rhythm without using medicines. If your heart rate and rhythm cant be controlled, you may need ablation and a pacemaker. These will help control the heart rate and regularity of the heartbeat.  · Surgery. During surgery, your  healthcare provider may use different methods to create scar tissue in the areas of the heart causing the abnormal signals. The scar tissue disrupts the abnormal signals and may stop AFib from occurring.  What are the complications of atrial fibrillation?  These can include:  · Blood clots  · Stroke  · Heart failure. This problem occurs when the heart muscle weakens so much that it can no longer pump blood well.  When should I call my healthcare provider?  Call your healthcare provider right away if you have any of these:  · Symptoms that dont get better with treatment, or get worse  · New symptoms   Date Last Reviewed: 5/1/2016  © 8548-1062 [employer]. [address] [place] [address]. All rights reserved. This information is not intended as a substitute for professional medical care. Always follow your healthcare professional's instructions.        Understanding Coronary Artery Disease (CAD)    To understand coronary artery disease (CAD), you need to know how your heart works. Your heart is a muscle that pumps blood throughout your body. To work right, your heart needs a steady supply of oxygen. It gets this oxygen from blood supplied by the coronary arteries.        Healthy Artery      Damaged Artery      Narrowed Artery      Blocked Artery   Healthy artery. When a coronary artery is healthy and has no blockages, blood flows through easily. Healthy arteries can easily supply the oxygen-rich blood your heart needs.  Damaged artery. Coronary artery disease begins when damage to the artery lining leads to the buildup of fat-like substances and cholesterol along the artery wall. This is called plaque. This damage could be caused by things like high blood pressure or smoking. This plaque buildup begins to narrow the arteries carrying blood to the heart. This is called atherosclerosis,  Narrowed artery. As more plaque builds up, your artery has trouble supplying blood to your heart muscle when  it needs it most, such as during exercise. You may not feel any symptoms when this happens. Or you may feel angina--pressure, tightness, achiness, or pain in your chest, jaw, neck, back, or arm.  Blocked artery. A piece of plaque may break off and completely block the artery. Or a blood clot may plug the narrowed artery. When this happens, blood flow is blocked from reaching the heart. Without oxygen-rich blood, part of the heart muscle becomes damaged and stops working. You may feel crushing pressure or pain in or around your chest. This is a heart attack (acute myocardial infarction, or AMI) and is a medical emergency.  Date Last Reviewed: 3/28/2016  © 3092-6717 [employer]. [address] [place] [address], [place] [address]. All rights reserved. This information is not intended as a substitute for professional medical care. Always follow your healthcare professional's instructions.             Language Assistance Services     ATTENTION: Language assistance services are available, free of charge. Please call [phone].      ATENCIÓN: Si tiara mata, tiene a mar disposición servicios gratuitos de asistencia lingüística. Llame al [phone].     [facility] Ý: N?u b?n nói Ti?ng Vi?t, có các d?ch v? h? tr? ngôn ng? mi?n phí dành cho b?n. G?i s? [phone].         [facility] complies with applicable Federal civil rights laws and does not discriminate on the basis of race, color, national origin, age, disability, or sex.

## 2017-04-27 NOTE — PATIENT INSTRUCTIONS
Understanding Atrial Fibrillation    An arrhythmia is any problem with the speed or pattern of the heartbeat. Atrial fibrillation (AFib) is a common type of arrhythmia. It causes fast, chaotic electrical signals in the atria. This leads to poor functioning of the heart. It also affects how much blood your heart can pump out to the body.  Afib may occur once in a while and go away on its own. Or it may continue for longer periods and need treatment.  AFib can lead to serious problems, such as stroke. Your healthcare provider will need to monitor and manage it.  What happens during atrial fibrillation?   The heart has an electrical system that sends signals to control the heartbeat. As signals move through the heart, they tell the hearts upper chambers (atria) and lower chambers (ventricles) when to squeeze (contract) and relax. This lets blood move through the heart and out to the body and lungs.  With AFib, the atria receive abnormal signals. This causes them to contract in a fast and irregular way, and out of sync with the ventricles. When this happens, the atria also have a harder time moving blood into the ventricles. Blood may then pool in the atria, which increases the risk for blood clots and stroke. The ventricles also may contract too quickly and irregularly. As a result, they may not pump blood to the body and lungs as well as they should. This can weaken the heart muscle over time and cause heart failure.  What causes atrial fibrillation?  AFib is more common in older adults. It has many possible causes including:  · Coronary artery disease  · Heart valve disease  · Heart attack  · Heart surgery  · High blood pressure  · Thyroid disease  · Diabetes  · Lung disease  · Sleep apnea  · Heavy alcohol use  In some cases of AFib, doctors do not know the cause.  What are the symptoms of atrial fibrillation?  AFib may or may not cause symptoms. If symptoms do occur, they may include:  · A fast, pounding,  irregular heartbeat  · Shortness of breath  · Tiredness  · Dizziness or fainting  · Chest pain  How is atrial fibrillation treated?  Treatments for AFib can include any of the options below.  · Medicines. You may be prescribed:  ¨ Heart rate medicines to help slow down the heartbeat  ¨ Heart rhythm medicines to help the heart beat more regularly  ¨ Anti-clotting medicines to help reduce the risk for blood clots and stroke  · Electrical cardioversion. Your healthcare provider uses special pads or paddles to send one or more brief electrical shocks to the heart. This can help reset the heartbeat to normal.  · Ablation. Long, thin tubes called catheters are threaded through a blood vessel to the heart. There, the catheters send out hot or cold energy to the areas causing the abnormal signals. This energy destroys the problem tissue or cells. This improves the chances that your heart will stay in normal rhythm without using medicines. If your heart rate and rhythm cant be controlled, you may need ablation and a pacemaker. These will help control the heart rate and regularity of the heartbeat.  · Surgery. During surgery, your healthcare provider may use different methods to create scar tissue in the areas of the heart causing the abnormal signals. The scar tissue disrupts the abnormal signals and may stop AFib from occurring.  What are the complications of atrial fibrillation?  These can include:  · Blood clots  · Stroke  · Heart failure. This problem occurs when the heart muscle weakens so much that it can no longer pump blood well.  When should I call my healthcare provider?  Call your healthcare provider right away if you have any of these:  · Symptoms that dont get better with treatment, or get worse  · New symptoms   Date Last Reviewed: 5/1/2016  © 5648-2343 [employer]. [address] [place] [address], [place] [address]. All rights reserved. This information is not intended as a substitute for professional  medical care. Always follow your healthcare professional's instructions.        Understanding Coronary Artery Disease (CAD)    To understand coronary artery disease (CAD), you need to know how your heart works. Your heart is a muscle that pumps blood throughout your body. To work right, your heart needs a steady supply of oxygen. It gets this oxygen from blood supplied by the coronary arteries.        Healthy Artery      Damaged Artery      Narrowed Artery      Blocked Artery   Healthy artery. When a coronary artery is healthy and has no blockages, blood flows through easily. Healthy arteries can easily supply the oxygen-rich blood your heart needs.  Damaged artery. Coronary artery disease begins when damage to the artery lining leads to the buildup of fat-like substances and cholesterol along the artery wall. This is called plaque. This damage could be caused by things like high blood pressure or smoking. This plaque buildup begins to narrow the arteries carrying blood to the heart. This is called atherosclerosis,  Narrowed artery. As more plaque builds up, your artery has trouble supplying blood to your heart muscle when it needs it most, such as during exercise. You may not feel any symptoms when this happens. Or you may feel angina--pressure, tightness, achiness, or pain in your chest, jaw, neck, back, or arm.  Blocked artery. A piece of plaque may break off and completely block the artery. Or a blood clot may plug the narrowed artery. When this happens, blood flow is blocked from reaching the heart. Without oxygen-rich blood, part of the heart muscle becomes damaged and stops working. You may feel crushing pressure or pain in or around your chest. This is a heart attack (acute myocardial infarction, or AMI) and is a medical emergency.  Date Last Reviewed: 3/28/2016  © 6761-0110 [employer]. [address] [place] [address], [place] [address]. All rights reserved. This information is not intended as a  substitute for professional medical care. Always follow your healthcare professional's instructions.

## 2017-05-25 RX ORDER — CARVEDILOL 12.5 MG/1
TABLET ORAL
Qty: 180 TABLET | Refills: 1 | Status: SHIPPED | OUTPATIENT
Start: 2017-05-25 | End: 2017-11-04 | Stop reason: SDUPTHER

## 2017-05-25 RX ORDER — ATORVASTATIN CALCIUM 80 MG/1
TABLET, FILM COATED ORAL
Qty: 90 TABLET | Refills: 1 | Status: SHIPPED | OUTPATIENT
Start: 2017-05-25 | End: 2017-06-28 | Stop reason: DRUGHIGH

## 2017-06-28 ENCOUNTER — OFFICE VISIT (OUTPATIENT)
Dept: CARDIOLOGY | Facility: CLINIC | Age: 72
End: 2017-06-28
Payer: MEDICARE

## 2017-06-28 VITALS
HEART RATE: 60 BPM | SYSTOLIC BLOOD PRESSURE: 118 MMHG | WEIGHT: 194.19 LBS | OXYGEN SATURATION: 96 % | HEIGHT: 68 IN | BODY MASS INDEX: 29.43 KG/M2 | DIASTOLIC BLOOD PRESSURE: 72 MMHG

## 2017-06-28 DIAGNOSIS — Q21.12 PFO (PATENT FORAMEN OVALE): ICD-10-CM

## 2017-06-28 DIAGNOSIS — E78.00 HYPERCHOLESTEREMIA: ICD-10-CM

## 2017-06-28 DIAGNOSIS — I08.0 MITRAL AND AORTIC REGURGITATION: ICD-10-CM

## 2017-06-28 DIAGNOSIS — I48.19 PERSISTENT ATRIAL FIBRILLATION: ICD-10-CM

## 2017-06-28 DIAGNOSIS — I50.1 LEFT HEART FAILURE: Primary | ICD-10-CM

## 2017-06-28 PROCEDURE — 93010 ELECTROCARDIOGRAM REPORT: CPT | Mod: ,,, | Performed by: INTERNAL MEDICINE

## 2017-06-28 PROCEDURE — 1159F MED LIST DOCD IN RCRD: CPT | Mod: S$GLB,,, | Performed by: INTERNAL MEDICINE

## 2017-06-28 PROCEDURE — 1126F AMNT PAIN NOTED NONE PRSNT: CPT | Mod: S$GLB,,, | Performed by: INTERNAL MEDICINE

## 2017-06-28 PROCEDURE — 99214 OFFICE O/P EST MOD 30 MIN: CPT | Mod: S$GLB,,, | Performed by: INTERNAL MEDICINE

## 2017-06-28 PROCEDURE — 93005 ELECTROCARDIOGRAM TRACING: CPT | Mod: S$GLB,,, | Performed by: INTERNAL MEDICINE

## 2017-06-28 RX ORDER — ATORVASTATIN CALCIUM 40 MG/1
40 TABLET, FILM COATED ORAL NIGHTLY
Qty: 90 TABLET | Refills: 1 | Status: SHIPPED | OUTPATIENT
Start: 2017-06-28 | End: 2018-01-08 | Stop reason: SDUPTHER

## 2017-06-28 NOTE — PATIENT INSTRUCTIONS
Understanding Atrial Fibrillation    An arrhythmia is any problem with the speed or pattern of the heartbeat. Atrial fibrillation (AFib) is a common type of arrhythmia. It causes fast, chaotic electrical signals in the atria. This leads to poor functioning of the heart. It also affects how much blood your heart can pump out to the body.  Afib may occur once in a while and go away on its own. Or it may continue for longer periods and need treatment.  AFib can lead to serious problems, such as stroke. Your healthcare provider will need to monitor and manage it.  What happens during atrial fibrillation?   The heart has an electrical system that sends signals to control the heartbeat. As signals move through the heart, they tell the hearts upper chambers (atria) and lower chambers (ventricles) when to squeeze (contract) and relax. This lets blood move through the heart and out to the body and lungs.  With AFib, the atria receive abnormal signals. This causes them to contract in a fast and irregular way, and out of sync with the ventricles. When this happens, the atria also have a harder time moving blood into the ventricles. Blood may then pool in the atria, which increases the risk for blood clots and stroke. The ventricles also may contract too quickly and irregularly. As a result, they may not pump blood to the body and lungs as well as they should. This can weaken the heart muscle over time and cause heart failure.  What causes atrial fibrillation?  AFib is more common in older adults. It has many possible causes including:  · Coronary artery disease  · Heart valve disease  · Heart attack  · Heart surgery  · High blood pressure  · Thyroid disease  · Diabetes  · Lung disease  · Sleep apnea  · Heavy alcohol use  In some cases of AFib, doctors do not know the cause.  What are the symptoms of atrial fibrillation?  AFib may or may not cause symptoms. If symptoms do occur, they may include:  · A fast, pounding,  irregular heartbeat  · Shortness of breath  · Tiredness  · Dizziness or fainting  · Chest pain  How is atrial fibrillation treated?  Treatments for AFib can include any of the options below.  · Medicines. You may be prescribed:  ¨ Heart rate medicines to help slow down the heartbeat  ¨ Heart rhythm medicines to help the heart beat more regularly  ¨ Anti-clotting medicines to help reduce the risk for blood clots and stroke  · Electrical cardioversion. Your healthcare provider uses special pads or paddles to send one or more brief electrical shocks to the heart. This can help reset the heartbeat to normal.  · Ablation. Long, thin tubes called catheters are threaded through a blood vessel to the heart. There, the catheters send out hot or cold energy to the areas causing the abnormal signals. This energy destroys the problem tissue or cells. This improves the chances that your heart will stay in normal rhythm without using medicines. If your heart rate and rhythm cant be controlled, you may need ablation and a pacemaker. These will help control the heart rate and regularity of the heartbeat.  · Surgery. During surgery, your healthcare provider may use different methods to create scar tissue in the areas of the heart causing the abnormal signals. The scar tissue disrupts the abnormal signals and may stop AFib from occurring.  What are the complications of atrial fibrillation?  These can include:  · Blood clots  · Stroke  · Heart failure. This problem occurs when the heart muscle weakens so much that it can no longer pump blood well.  When should I call my healthcare provider?  Call your healthcare provider right away if you have any of these:  · Symptoms that dont get better with treatment, or get worse  · New symptoms   Date Last Reviewed: 5/1/2016  © 2072-4345 [employer]. [address] [place] [address], [place] [address]. All rights reserved. This information is not intended as a substitute for professional  medical care. Always follow your healthcare professional's instructions.        Left-Sided Congestive Heart Failure    The heart is a large muscle that pumps blood throughout the body. Blood carries oxygen to all the organs (including the brain), muscles, and skin of your body. After the body takes the oxygen out of the blood, the blood returns to the heart. The right side of the heart collects that blood and pumps it to the lungs to receive fresh oxygen. This oxygen-rich blood from the lungs then returns to the left side of the heart, where it is pumped back out to the brain and rest of the body, starting the process all over.   Heart failure occurs when the heart muscle is weakened. This can occur after a heart attack or with significant coronary artery disease. This affects the pumping action of the heart.   When the left side of the heart is weakened, it cant handle the blood it is getting from the lungs. Pressure then builds up in the veins of the lungs, causing fluid to leak into the lung tissues. This may be referred to as congestive heart failure. This causes you to feel short of breath, weak, or dizzy. These symptoms are often worse with exertion, such as climbing stairs or walking up hills. Lying flat is uncomfortable and can make your breathing worse. This may make sleeping difficult and force you to use extra pillows to elevate your upper body to help you sleep well.  Causes of heart failure include:  · Coronary artery disease  · Heart attack in the past (also known as acute myocardial infarction, or AMI)  · High blood pressure  · Damaged heart valve  · Diabetes  · Obesity  · Cigarette smoking  · Alcohol abuse  Treatment  Heart failure is a chronic condition. There is no cure. The purpose of medical treatment is to improve the pumping action of the heart, and remove excess water and fluids from the body. A number of medicines can help reach this goal, improve symptoms and keep the heart from becoming  weaker. In some cases of severe heart failure, a mechanical device will be placed in the heart to help the heart pump. Another major goal is to better treat the causes of heart failure, such as diabetes, high blood pressure, and your lifestyle.  Home care  · Check your weight every day. A sudden increase in weight gain could mean worsening heart failure.  ¨ Use the same scale every day.  ¨ Weigh yourself at the same time every day.  ¨ Make sure the scale is on the floor, not on a rug.  ¨ Keep a record of your weight every day, so your healthcare provider can see it. If you are not given a log sheet for this, keep a separate journal for this purpose.   · Cut back on how much salt (sodium) you eat:  ¨ Your provider will tell you how much salt to have daily, usually 2,000 mg or less.  ¨ Avoid high-salt foods. These include olives, pickles, smoked meats, processed foods, and salted potato chips.  ¨ Don't add salt to your food at the table. Use only small amounts of salt when cooking.  ¨ Avoid binging on salt-heavy meals.  · Follow your healthcare provider's recommendations about how much fluid you should have.  · Stop smoking.  · Cut back on the amount of alcohol you drink.  · Lose weight if you are overweight. The excess weight adds a lot of stress on the workload of the heart.  · Stay active. Talk to your provider about an exercise program that is safe for your heart.  · Keep your feet elevated to reduce swelling. Ask your provider about support hose as a preventive treatment for daytime leg swelling.  · Follow your healthcare provider's instructions closely.  Besides taking your medicine as instructed, an important part of treatment includes lifestyle changes. These include diet, physical activity, stopping smoking, and weight control.  Improve your diet. Often in the hospital, people are given a heart healthy diet. This includes more fresh foods, lower saturated fat, less processed foods, and lower salt.  Follow-up  care  Follow up with your healthcare provider, or as advised. Make sure to keep any appointments that were made for you. This can help better control heart failure.  If an X-ray was done, you will be told of any new findings that may affect your care.  Call 911  Call 911 if you:  · Become severely short of breath  · Feel lightheaded, or feel like you might pass out or faint  · Have chest pain or discomfort that is different than usual, the medicines your provider told you to use for this don't help, or the pain lasts longer than 10 to 15 minutes  · Develop a rapid heart rate suddenly  When to seek medical advice  Call your healthcare provider right away if you have any of these signs of worsening heart failure:  · Sudden weight gain --  more than 2 pounds in 1 day, or 5 pounds in 1 week, or whatever weight gain you were told to report by your provider  · Trouble breathing not related to being active  · New or increased swelling of your legs or ankles  · Swelling or pain in your abdomen  · Breathing trouble at night, waking up short of breath or needing more pillows to elevate your upper body to help you breathe  · Frequent coughing that doesnt go away  · Feeling much more tired than usual  Date Last Reviewed: 1/4/2016  © 5810-3681 [employer]. [address] [place] [address], [place] [address]. All rights reserved. This information is not intended as a substitute for professional medical care. Always follow your healthcare professional's instructions.        Understanding Heart Valves  The heart contains 4 valves. They are the aortic, pulmonary, tricuspid, and mitral valves. The valves open and close to keep blood moving in the right direction through the heart. As the heart beats, blood moves through it. With each squeeze, the valves open and close to keep blood moving forward. In this way, valves keep blood moving as efficiently as possible through the heart.     The valve opens to let blood move forward.         The valve closes to stop blood from leaking back.       When the heart relaxes between beats:  · Oxygen-rich blood from the lungs fills the left atrium.  · Oxygen-poor blood from the body fills the right atrium.  When the atria beat:  · The left atrium squeezes, pushing blood through the mitral valve into the left ventricle.  · The right atrium squeezes, pushing blood through the tricuspid valve into the right ventricle.  When the ventricles beat:  · The left ventricle squeezes, pushing blood through the aortic valve out to the coronary arteries, the brain, and body.  · The right ventricle squeezes, pushing blood through the pulmonary valve to the lungs.    Date Last Reviewed: 4/27/2016 © 2000-2016 [employer]. [address] [place] [address], [place] [address]. All rights reserved. This information is not intended as a substitute for professional medical care. Always follow your healthcare professional's instructions.

## 2017-06-28 NOTE — PROGRESS NOTES
Subjective:    Patient ID:  Kaiden Forde is a 71 y.o. male who presents for Coronary Artery Disease; Congestive Heart Failure; and Atrial Fibrillation      HPI  S/P CARDIOVERSION , CONVERTED TO NSR, FELT BETTER, NOW SLIGHT FATIGUE, DISCUSSED LABS, CHOL 102, LDL 27, SH OK,MARK MILD MOD MR, TRACE TO MILD AI, PFO,  SEE ROS  Past Medical History:   Diagnosis Date    Acute coronary syndrome     Arthritis     Atrial fibrillation     Atrial flutter     Cancer     Cardiomyopathy     Carotid artery occlusion     CHF (congestive heart failure)     Coronary artery disease     Diabetes mellitus     Encounter for blood transfusion     GERD (gastroesophageal reflux disease)     Heart murmur     Hyperlipidemia     Hypertension     Hypothyroidism     Legionnaire's disease     Sleep apnea     Stroke     MINI    Valvular regurgitation      Past Surgical History:   Procedure Laterality Date    CARDIAC CATHETERIZATION      CAROTID ENDARTERECTOMY      CORONARY ANGIOPLASTY      CORONARY ARTERY BYPASS GRAFT      CORONARY STENT PLACEMENT      KNEE SURGERY      SHOULDER SURGERY Left      Family History   Problem Relation Age of Onset    Heart disease Brother      Social History     Social History    Marital status:      Spouse name: N/A    Number of children: N/A    Years of education: N/A     Social History Main Topics    Smoking status: Former Smoker     Quit date: 3/11/2001    Smokeless tobacco: Never Used    Alcohol use No    Drug use: No    Sexual activity: No     Other Topics Concern    None     Social History Narrative    None       Review of patient's allergies indicates:  No Known Allergies    Current Outpatient Prescriptions:     aspirin (ECOTRIN) 81 MG EC tablet, Take 81 mg by mouth every evening. , Disp: , Rfl:     atorvastatin (LIPITOR) 80 MG tablet, Take 1 tablet by mouth  every night at bedtime, Disp: 90 tablet, Rfl: 1    benazepril (LOTENSIN) 20 MG tablet, Take 1 tablet  by mouth  daily (Patient taking differently: Take 1 tablet by mouth  evening), Disp: 90 tablet, Rfl: 1    carvedilol (COREG) 12.5 MG tablet, Take 1 tablet by mouth  twice a day as directed, Disp: 180 tablet, Rfl: 1    furosemide (LASIX) 40 MG tablet, Take 60 mg by mouth once daily. , Disp: , Rfl:     levothyroxine (SYNTHROID) 50 MCG tablet, Take 1 tablet by mouth once daily, Disp: 90 tablet, Rfl: 1    metformin (GLUCOPHAGE) 1000 MG tablet, Take 1 tablet by mouth two  times daily, Disp: 180 tablet, Rfl: 1    mv-min-folic acid-lutein (CENTRUM SILVER) 400-250 mcg Chew, Take 1 tablet by mouth once daily at 6am. , Disp: , Rfl:     nitroGLYCERIN (NITROSTAT) 0.4 MG SL tablet, Place 1 tablet (0.4 mg total) under the tongue every 5 (five) minutes as needed for Chest pain., Disp: 25 tablet, Rfl: 0    nitroGLYCERIN 0.2 mg/hr TD PT24 (NITRODUR) 0.2 mg/hr, Place 1 patch onto the skin once daily. , Disp: , Rfl:     omeprazole (PRILOSEC) 20 MG capsule, Take 20 mg by mouth once daily., Disp: , Rfl:     PANTOTHENIC ACID ORAL, Take 1 tablet by mouth once daily at 6am. , Disp: , Rfl:     potassium chloride (KLOR-CON) 10 MEQ TbSR, Take 1 tablet by mouth  every other day, Disp: 45 tablet, Rfl: 1    rivaroxaban (XARELTO) 20 mg Tab, Take 1 tablet (20 mg total) by mouth once daily., Disp: 180 tablet, Rfl: 1    zinc 50 mg Tab, Take 1 tablet by mouth once daily at 6am. , Disp: , Rfl:     Review of Systems   Constitution: Negative for chills, decreased appetite, diaphoresis, fever, weakness and night sweats. Malaise/fatigue: MILD. Weight loss: COUPLE OF POUNDS.   HENT: Negative for congestion. Nosebleeds: RARE/WITH DRY AIR/ CPAP.    Eyes: Negative for blurred vision, discharge and visual disturbance.   Cardiovascular: Negative for chest pain, claudication, cyanosis, irregular heartbeat (NOT FELT), leg swelling, near-syncope, orthopnea, palpitations, paroxysmal nocturnal dyspnea and syncope. Dyspnea on exertion: MILD, IF SKIPS  "LASIX.   Respiratory: Negative for cough, hemoptysis, sleep disturbances due to breathing, sputum production and wheezing.    Endocrine: Negative for cold intolerance, heat intolerance, polydipsia, polyphagia and polyuria.   Hematologic/Lymphatic: Negative for adenopathy and bleeding problem. Does not bruise/bleed easily.   Skin: Negative for color change, itching and nail changes.   Musculoskeletal: Negative for back pain and falls.   Gastrointestinal: Negative for bloating, abdominal pain, change in bowel habit, dysphagia, heartburn, hematemesis, jaundice, melena, nausea and vomiting.   Genitourinary: Negative for dysuria, frequency, hematuria and incomplete emptying. Nocturia: ONCE.   Neurological: Negative for brief paralysis, difficulty with concentration, dizziness, focal weakness, light-headedness, loss of balance (OCC), numbness, paresthesias, seizures and tremors.   Psychiatric/Behavioral: Negative for altered mental status, depression and substance abuse. The patient is not nervous/anxious. Insomnia: MILD.         Objective:      Vitals:    06/28/17 1143   BP: 118/72   Pulse: 60   SpO2: 96%   Weight: 88.1 kg (194 lb 3.2 oz)   Height: 5' 8" (1.727 m)   PainSc: 0-No pain     Body mass index is 29.53 kg/m².    Physical Exam   Constitutional: He is oriented to person, place, and time. He appears well-developed and well-nourished.   OVERWEIGHT   HENT:   Head: Normocephalic and atraumatic.   Mouth/Throat: Oropharynx is clear and moist.   Eyes: Conjunctivae and EOM are normal. Pupils are equal, round, and reactive to light.   Neck: Neck supple. Normal carotid pulses and no hepatojugular reflux present. Carotid bruit is present. No tracheal deviation present. No thyromegaly present.   Cardiovascular: Normal rate.  An irregular rhythm present. Exam reveals no gallop, no distant heart sounds, no friction rub and no midsystolic click.    Murmur heard.   Blowing holosystolic murmur is present with a grade of 2/6  at " the apex   Early diastolic murmur is present with a grade of 1/6  at the upper right sternal border  Pulses:       Carotid pulses are 2+ on the right side with bruit, and 2+ on the left side with bruit.       Radial pulses are 2+ on the right side, and 2+ on the left side.        Femoral pulses are 2+ on the right side, and 2+ on the left side.       Posterior tibial pulses are 2+ on the right side, and 2+ on the left side.   Pulmonary/Chest: Effort normal and breath sounds normal.   STERNOTOMY SCAR   Abdominal: Soft. Bowel sounds are normal. He exhibits no distension and no mass. There is no hepatosplenomegaly. There is no tenderness. There is no CVA tenderness.   Musculoskeletal: Normal range of motion. He exhibits no edema.   Lymphadenopathy:     He has no cervical adenopathy.   Neurological: He is alert and oriented to person, place, and time. He displays no tremor. No cranial nerve deficit. Coordination normal.   Skin: Skin is warm and dry. No bruising noted. No cyanosis or erythema.   Psychiatric: He has a normal mood and affect. His speech is normal and behavior is normal. Judgment and thought content normal. Cognition and memory are normal.               ..    Chemistry        Component Value Date/Time     06/26/2017 1236    K 4.5 06/26/2017 1236    CL 98 06/26/2017 1236    CO2 30 06/26/2017 1236    BUN 33 (H) 06/26/2017 1236    CREATININE 1.28 06/26/2017 1236     06/26/2017 1236        Component Value Date/Time    CALCIUM 9.9 06/26/2017 1236    ALKPHOS 64 06/26/2017 1236    AST 34 06/26/2017 1236    ALT 45 (H) 06/26/2017 1236    BILITOT 1.0 06/26/2017 1236            ..  Lab Results   Component Value Date    CHOL 102 (L) 06/26/2017     Lab Results   Component Value Date    HDL 66 06/26/2017     Lab Results   Component Value Date    LDLCALC 27.6 (L) 06/26/2017     Lab Results   Component Value Date    TRIG 42 06/26/2017     Lab Results   Component Value Date    CHOLHDL 64.7 (H) 06/26/2017      ..No results found for: WBC, HGB, HCT, MCV, PLT    Test(s) Reviewed  I have reviewed the following in detail:  [] Stress test   [] Angiography   [] Echocardiogram   [x] Labs   [x] Other:       Assessment:         ICD-10-CM ICD-9-CM   1. Left heart failure I50.1 428.1   2. Persistent atrial fibrillation I48.1 427.31   3. PFO (patent foramen ovale) Q21.1 745.5   4. Mitral and aortic regurgitation I08.0 396.3     Problem List Items Addressed This Visit        Cardiac    Persistent atrial fibrillation    Left heart failure - Primary    PFO (patent foramen ovale)    Mitral and aortic regurgitation      Other Visit Diagnoses    None.          Plan:     EKG A FIB IN 70'S, RECURRENT A FIB, DISCUSSED OPTIONS, RFA, DECLINES, HR OK, DAILY WEIGHT, DECREASE LIPITOR TO 40 MG, ALL OTHER CV CLINICALLY STABLE, NO ANGINA, STABLE HF, NO TIA, STABLE  CLINICAL ARRHYTHMIA,CONTINUE CURRENT MEDS, EDUCATION, DIET, EXERCISE, RTC IN 4 MO WITH LABS      Left heart failure    Persistent atrial fibrillation    PFO (patent foramen ovale)    Mitral and aortic regurgitation    RTC Low level/low impact aerobic exercise 5x's/wk. Heart healthy diet and risk factor modification.    See labs and med orders.    Aerobic exercise 5x's/wk. Heart healthy diet and risk factor modification.    See labs and med orders.

## 2017-09-06 ENCOUNTER — TELEPHONE (OUTPATIENT)
Dept: PHARMACY | Facility: CLINIC | Age: 72
End: 2017-09-06

## 2017-09-06 RX ORDER — RIVAROXABAN 20 MG/1
TABLET, FILM COATED ORAL
Qty: 90 TABLET | Refills: 1 | Status: ON HOLD | OUTPATIENT
Start: 2017-09-06 | End: 2018-01-18 | Stop reason: HOSPADM

## 2017-09-18 RX ORDER — NITROGLYCERIN 40 MG/1
PATCH TRANSDERMAL
Qty: 90 PATCH | Refills: 1 | Status: SHIPPED | OUTPATIENT
Start: 2017-09-18 | End: 2017-12-06 | Stop reason: SDUPTHER

## 2017-11-04 RX ORDER — CARVEDILOL 12.5 MG/1
TABLET ORAL
Qty: 180 TABLET | Refills: 0 | Status: SHIPPED | OUTPATIENT
Start: 2017-11-04 | End: 2017-12-06 | Stop reason: SDUPTHER

## 2017-11-04 RX ORDER — BENAZEPRIL HYDROCHLORIDE 20 MG/1
TABLET ORAL
Qty: 90 TABLET | Refills: 0 | Status: SHIPPED | OUTPATIENT
Start: 2017-11-04 | End: 2017-12-06 | Stop reason: SDUPTHER

## 2017-11-04 RX ORDER — POTASSIUM CHLORIDE 750 MG/1
TABLET, EXTENDED RELEASE ORAL
Qty: 45 TABLET | Refills: 0 | Status: SHIPPED | OUTPATIENT
Start: 2017-11-04 | End: 2018-01-08 | Stop reason: SDUPTHER

## 2017-12-06 ENCOUNTER — OFFICE VISIT (OUTPATIENT)
Dept: CARDIOLOGY | Facility: CLINIC | Age: 72
End: 2017-12-06
Payer: MEDICARE

## 2017-12-06 VITALS
SYSTOLIC BLOOD PRESSURE: 112 MMHG | HEART RATE: 62 BPM | HEIGHT: 68 IN | BODY MASS INDEX: 30.91 KG/M2 | DIASTOLIC BLOOD PRESSURE: 66 MMHG | WEIGHT: 203.94 LBS

## 2017-12-06 DIAGNOSIS — I25.709 CORONARY ARTERY DISEASE INVOLVING CORONARY BYPASS GRAFT OF NATIVE HEART WITH ANGINA PECTORIS: ICD-10-CM

## 2017-12-06 DIAGNOSIS — E66.9 OBESITY, CLASS I, BMI 30-34.9: ICD-10-CM

## 2017-12-06 DIAGNOSIS — I10 ESSENTIAL HYPERTENSION: ICD-10-CM

## 2017-12-06 DIAGNOSIS — I50.1 LEFT HEART FAILURE: Primary | ICD-10-CM

## 2017-12-06 PROCEDURE — 99214 OFFICE O/P EST MOD 30 MIN: CPT | Mod: S$GLB,,, | Performed by: INTERNAL MEDICINE

## 2017-12-06 RX ORDER — NITROGLYCERIN 40 MG/1
PATCH TRANSDERMAL
Qty: 90 PATCH | Refills: 1 | Status: SHIPPED | OUTPATIENT
Start: 2017-12-06 | End: 2018-04-04 | Stop reason: SDUPTHER

## 2017-12-06 RX ORDER — CARVEDILOL 12.5 MG/1
12.5 TABLET ORAL 2 TIMES DAILY
Qty: 180 TABLET | Refills: 1 | Status: SHIPPED | OUTPATIENT
Start: 2017-12-06 | End: 2018-04-04 | Stop reason: SDUPTHER

## 2017-12-06 RX ORDER — BENAZEPRIL HYDROCHLORIDE 20 MG/1
20 TABLET ORAL DAILY
Qty: 90 TABLET | Refills: 1 | Status: ON HOLD | OUTPATIENT
Start: 2017-12-06 | End: 2018-01-18 | Stop reason: HOSPADM

## 2017-12-06 NOTE — PATIENT INSTRUCTIONS
What Is Angina?  Angina is a warning that your heart muscle is not getting enough oxygen-rich blood and is at risk for damage. Medicines, certain medical procedures, and lifestyle changes can help control angina. Talk with your healthcare provider about how to prevent angina and what to do if you get it.    How does angina feel?  Angina is often described as chest pain, but this can be misleading. Angina is not always painful, and it isnt always felt in the chest. Angina might feel like:  · Discomfort, aching, tightness, or pressure that comes and goes. You may feel this in your chest, back, abdomen, arm, shoulder, neck, or jaw.  · Tiredness that gets worse or you have more tiredness than usual for no clear reason  · Shortness of breath while doing something that used to be easy  · Heartburn, indigestion, nausea, or sweating  Call 911 right away if any of your symptoms lasts for more than a few minutes. Or if they go away and come back. Or if they happen at rest and don't go away after taking nitroglycerin. Or if they continue to get worse. You could be having a heart attack (acute myocardial infarction).  When does angina happen?  · Angina usually happens during activity. It can also occur when youre upset or after a large meal. Sometimes angina can happen when the weather is too hot or too cold. All of these things can put more stress on your body and your heart.  · You may have unstable angina if angina starts occurring more often, lasts longer, happens even when you are resting, or causes more discomfort. Its a sign that your heart problem may be getting worse. You need to call your healthcare provider right away.  Date Last Reviewed: 10/1/2016  © 6470-7481 [employer]. [address] [place] [address], [place] [address]. All rights reserved. This information is not intended as a substitute for professional medical care. Always follow your healthcare professional's instructions.        Understanding  Coronary Artery Disease (CAD)    To understand coronary artery disease (CAD), you need to know how your heart works. Your heart is a muscle that pumps blood throughout your body. To work right, your heart needs a steady supply of oxygen. It gets this oxygen from blood supplied by the coronary arteries.     Healthy artery   Healthy artery. When a coronary artery is healthy and has no blockages, blood flows through easily. Healthy arteries can easily supply the oxygen-rich blood your heart needs.     Damaged artery   Damaged artery. Coronary artery disease begins when damage to the artery lining leads to the buildup of fat-like substances and cholesterol along the artery wall. This is called plaque. This damage could be caused by things like high blood pressure or smoking. This plaque buildup begins to narrow the arteries carrying blood to the heart. This is called atherosclerosis,     Narrowed artery   Narrowed artery. As more plaque builds up, your artery has trouble supplying blood to your heart muscle when it needs it most, such as during exercise. You may not feel any symptoms when this happens. Or you may feel angina--pressure, tightness, achiness, or pain in your chest, jaw, neck, back, or arm.     Blocked artery   Blocked artery. A piece of plaque may break off and completely block the artery. Or a blood clot may plug the narrowed artery. When this happens, blood flow is blocked from reaching the heart. Without oxygen-rich blood, part of the heart muscle becomes damaged and stops working. You may feel crushing pressure or pain in or around your chest. This is a heart attack (acute myocardial infarction, or AMI) and is a medical emergency.     Date Last Reviewed: 3/28/2016  © 0948-4502 [employer] [address] [place] [address], [place] [address]. All rights reserved. This information is not intended as a substitute for professional medical care. Always follow your healthcare professional's  instructions.        Diabetes and Heart Disease     Take your medicines as directed each day, even if you feel fine.   If you have diabetes, you are two to four times more likely to have heart disease than someone without diabetes. This higher risk is due to diabetes, but it is also due to other risk factors for heart disease that happen in people with diabetes. But theres good news. You can help control your health risks by making some changes in your life. You can take steps to reduce your risk of heart disease by half--similar to the risk in people who don't have diabetes.  Your main risk factors  Three major risk factors for heart disease are high blood sugar, high blood pressure, and high levels of lipids. By keeping risk factors under control, you can help keep your heart and arteries healthy. This may reduce your chances of a heart attack.  · Blood sugar. High blood sugar can make artery walls tough and rough. Plaque (waxy material in the blood) can then build up along the artery walls, making it harder for blood to flow through the arteries. Having high blood sugar increases the chances of having high blood pressure and high cholesterol.  · Blood pressure. When blood pressure is high all the time it causes your heart to work harder to pump blood. Artery walls become damaged. This increases the risk for plaque build up.  · Lipids. The body needs some lipids in the blood to stay healthy. But lipid levels that are too high can damage the artery walls. Lipids include cholesterol and triglycerides. There are two kinds of cholesterol. LDL (bad) cholesterol can damage the arteries. But HDL (good) cholesterol helps clear LDL cholesterol from the blood vessels. This helps keep the arteries healthy. When blood sugar is high, the level of triglycerides in the blood may also be high. High blood triglyceride levels can cause plaque to form.   Other risk factors  Certain lifestyle factors can increase levels of your  blood sugar, blood pressure, and lipids. Such increases raise your risk of heart disease:  · Smoking damages the lining of your arteries. This allows plaque to build up in the artery walls. Smoking also constricts (narrows) the arteries. This can raise blood pressure and cause chest pain or angina. Smoking also increases your risk of getting type 2 diabetes.  · Not being active makes it harder for your heart to do its work. Inactivity is linked to many other risk factors, such as high blood pressure and poor cholesterol levels. Inactivity also increases your risk of getting type 2 diabetes.  · Being overweight makes it harder for your body to use insulin. It also makes your heart work too hard. Being overweight is also the main contributor to the development of type 2 diabetes,   Changes you can make  Following a few simple steps can help keep your risk factors under control. Work with your healthcare team to reach your goals.  · Quitting smoking could save your life. Smoking damages the lining of the blood vessels and raises blood pressure. Smoking also affects how your body uses insulin. This makes it harder to keep blood sugar under control. If you smoke and need help quitting, talk to your healthcare team.   · Testing your blood sugar is the only way to know whether it is under control. Be sure to test your blood sugar yourself. Also get your blood tested in the lab, as directed.  · Monitoring your blood pressure and lipid levels can help you achieve safe levels. Visit your healthcare team as scheduled.  · Taking medicines as directed can help control blood sugar, blood pressure, blood clotting, and/or cholesterol levels.  · Eating right can reduce your risk factors and help you lose weight. Try to limit the amount of processed or refined carbohydrates you eat at one time. Cut back on your total calorie intake. Eat foods low in saturated fat and cholesterol. Eat fiber, including vegetables and whole grains, and  cut down on salt. A dietitian or diabetes educator can help form a meal plan that works for you--even if you are on a low budget.   · Being active can help reduce your weight, strengthen your heart, and lower your lipid levels and blood pressure. Exercise and activity are good for your whole body. Talk to your healthcare team about increasing your activity safely over time.  · Keeping your appointments with your healthcare provider helps you stay healthy. Go in for checkups and lab tests as scheduled.  Date Last Reviewed: 5/19/2016 © 2000-2017 [employer]. [address] [place] [id number]. All rights reserved. This information is not intended as a substitute for professional medical care. Always follow your healthcare professional's instructions.        Exercise for a Healthier Heart  You may wonder how you can improve the health of your heart. If youre thinking about exercise, youre on the right track. You dont need to become an athlete, but you do need a certain amount of brisk exercise to help strengthen your heart. If you have been diagnosed with a heart condition, your doctor may recommend exercise to help stabilize your condition. To help make exercise a habit, choose safe, fun activities.     Exercise with a friend. When activity is fun, you're more likely to stick with it.     Be sure to check with your healthcare provider before starting an exercise program.   Why exercise?  Exercising regularly offers many healthy rewards. It can help you do all of the following:  · Improve your blood cholesterol level to help prevent further heart trouble  · Lower your blood pressure to help prevent a stroke or heart attack  · Control diabetes, or reduce your risk of getting this disease  · Improve your heart and lung function  · Reach and maintain a healthy weight  · Make your muscles stronger and more limber so you can stay active  · Prevent falls and fractures by slowing the loss of bone mass  (osteoporosis)  · Manage stress better  · Reduce your blood pressure  · Improve your sense of self and your body image  Exercise tips  Ease into your routine. Set small goals. Then build on them.  Exercise on most days. Aim for a total of 150 or more minutes of moderate to  vigorous intensity activity each week. Consider 40 minutes, 3 to 4 times a week. For best results, activity should last for 40 minutes on average. It is OK to work up to the 40 minute period over time. Examples of moderate-intensity activity is walking 1 mile in 15 minutes or 30 to 45 minutes of yard work.  Step up your daily activity level. Along with your exercise program, try being more active throughout the day. Walk instead of drive. Do more household tasks or yard work.  Choose one or more activities you enjoy. Walking is one of the easiest things you can do. You can also try swimming, riding a bike, dancing, or taking an exercise class.  Stop exercising and call your doctor if you:  · Have chest pain or feel dizzy or lightheaded  · Feel burning, tightness, pressure, or heaviness in your chest, neck, shoulders, back, or arms  · Have unusual shortness of breath  · Have increased joint or muscle pain  · Have palpitations or an irregular heartbeat   Date Last Reviewed: 5/1/2016 © 2000-2017 [employer]. [address] [place] [address], [place] [address]. All rights reserved. This information is not intended as a substitute for professional medical care. Always follow your healthcare professional's instructions.        Heart Disease Education    The heart beats 60 to 100 times per minute, 24 hours a day. This equals almost 1000,000 times a day. It pumps blood with oxygen and nutrients to the tissues and organs of the body. But the heart is a muscle and needs its own supply of blood. Blood flow to the heart is supplied by the coronary arteries. Coronary artery disease (atherosclerosis) is a result of cholesterol, saturated fat, and calcium  deposits (plaques) that build up inside the walls. This causes inflammation within the coronary arteries. These plaques narrow the artery and reduce blood flow to the heart muscle. The reduction in blood flow to the heart muscle decreases oxygen supply to the heart. If the narrowing is significant enough, the oxygen supply to one or more regions of the heart can be temporarily or permanently shut down. This can cause chest pain, and possibly death of heart tissue (heart attack).  Types of chest pain  Angina is the name for pain in the heart muscle. Angina is a warning sign of serious heart disease. When untreated it can lead to a heart attack, also known as acute myocardial infarction, or AMI. Angina occurs when there is not enough blood and oxygen flowing to the heart for the amount of work it is doing. This most often happens during physical exertion, when the heart is working hardest. It is usually relieved by rest or nitroglycerin. Angina may also occur after a large meal when extra blood is sent to the digestive organs and less goes to the heart. In the case of advanced or unstable heart disease, angina can occur at rest or awaken you from sleep. Angina usually lasts from a few minutes up to 20 minutes or more. When treated early, the effects of angina can be reversed without permanent damage to the heart. Angina is a serious condition and needs to be evaluated by a medical professional immediately.  There are two types of angina -- stable and unstable:  · Stable angina usually occurs with a predictable level of activity. Being stable, its character, severity, and occurrence do not change much over time. It usually starts with activity, and resolves with rest or taking your medicine as instructed by your doctor. The symptoms usually do not last long.  · Unstable angina changes or gets worse over time. It is different from whatever you are used to. It may feel different or worse, begin without cause, occur with  "exercise or exertion, wake you up from sleep, and last longer. It may not respond in the same way as it does when you take your usual medicines for an attack. This type of angina can be a warning sign of an impending heart attack.     A heart attack is usually the result of a blood clot that suddenly forms in a coronary artery that has been narrowed with plaque. When this occurs, blood flow may be cut off to a part of the heart muscle, causing the cells to die. This weakens the pumping action of the heart, which affects the delivery of blood to all the other organs in the body including the brain. This damage is not reversible. However, early treatment can limit the amount of damage.  The pain you feel with angina and a heart attack may have a similar quality. However, it is usually different in intensity and duration. Here are some typical descriptions of a heart attack:  · It is most often experienced as a squeezing, crushing, pressure-like sensation in the center of the chest.  · It is sometimes described as something heavy sitting on my chest.  · It may feel more like a bad case of indigestion.  · The pain may spread from the chest to the arm, shoulder, throat or jaw.  · Sometimes the pain is not felt in the chest at all, but only in the arm, shoulder, throat or jaw.  · There may also be nausea, vomiting, dizziness or light-headedness, sweating and trouble breathing.  · Palpitations, or your heart beating rapidly  · A new, irregular heart beat  · Unexplained weakness  You may not be able to tell the difference between "bad" angina and a heart attack at home. Seek help if your symptoms are different than usual. Do not be in denial or just try to "tough it out."  Call 911  This is the fastest and safest way to get to the emergency department. The paramedics can also start treatment on the way to the hospital, saving valuable time for your heart.  · If the angina gets worse, if it continues, or if it stops and " returns, call 911 immediately. Do not delay. You may be having a heart attack.  · After you call 911, take a second tablet or spray unless instructed otherwise. When repeating doses, sit down if possible, because it can make you feel lightheaded or dizzy. Wait another 5 minutes. If the angina still does not go away, take a third tablet or spray. Do not take more than 3 tablets or sprays within 15 minutes. Stay on the phone with 911 for further instruction.  · Your healthcare provider may give you slightly different instructions than those above. If so, follow them carefully.  Do not wait until symptoms become severe to call 911.  Other reasons to call 911 include:  · Trouble breathing  · Feeling lightheaded, faint, or dizzy  · Rapid heart beat  · Slower than usual heart rate compared to your normal  · Angina with weakness, dizziness, fainting, heavy sweating, nausea, or vomiting  · Extreme drowsiness, confusion  · Weakness of an arm or leg or one side of the face  · Difficulty with speech or vision  When to seek medical care  Remember, the signs and symptoms of a heart attack are not always like they are on TV. Sometimes they are not so obvious. You may only feel weak, or just not right. If it is not clear or if you have any doubt, call for advice.  · Seek help if there is a change in the type of pain, if it feels different, or if your symptoms are mild.  · Do not drive yourself. Have someone else drive you. If no one can drive, call 911.  · Do not delay. Fast diagnosis and treatment can prevent or limit the amount of heart damage during a heart attack.  · Do not go to your doctor's office or a clinic as they may not be able to provide all the testing and treatment required for this condition.  · If your doctor has given you medicine to take when symptoms occur, take them but don't delay getting help trying to locate medicines.  What happens in the emergency department  The emergency department is connected to your  "local emergency medical system (EMS) through 911. That's why during a cardiac emergency, calling 911 is the fastest way to get help. The goal of the emergency department is to rapidly screen, evaluate, and treat people.  Once you are there, an electrocardiogram (ECG or heart tracing) will be done. Blood samples may be taken to look for the presence of heart enzymes that leak from damaged heart cells and show if a heart attack is occurring. You will often be evaluated by a heart specialist (cardiologist) who decides the best course of action. In the case of severe angina or early heart attack, and depending on the circumstances, powerful "clot busting" medicines can be used to dissolve blood clots in the coronary artery. In other cases, you may be taken to a cardiac catheterization lab. Here, a tiny balloon-tipped catheter is advanced through blood vessels to the heart. There the balloon is inflated pushing open the blood vessel restoring blood flow.  Risk factors for heart disease  Risk factors for heart disease are a combination of genetic and lifestyle. Many risk factors work by either directly or indirectly damaging the blood vessels of the heart, or by increasing the risk of forming blood or cholesterol clots, which then clog up and block the arteries.     Examples of physical lifestyle risk factors:  · Cigarette smoking  · High blood pressure  · High blood cholesterol  · Use of stimulant drugs such as cocaine, crack, and amphetamines  · Eating a high-fat, high-cholesterol meal  · Diabetes   · Obesity which increases risk for diabetes and high blood pressure  · Lack of regular physical activity     Examples of emotional lifestyle factors:  · Chronic high stress levels release stress hormones. These raise blood pressure and cholesterol level and makes blood clot more easily.  · Held-in anger, hostile or cynical attitude  · Social and emotional isolation, lack of intimacy  · Loss of " relationship  · Depression  Other factors that increase the risk of heart attack that you cannot control :  · Age. The older you get beyond 40, the greater is your risk of significant coronary artery disease.  · Gender. More men than women get heart disease; but once past menopause, women who are not taking estrogen replacement have the same risk as men for a heart attack.  · Family history. If your mother, father, brother or sister has coronary artery disease, your risk of having it is higher than a person your age without this family history.  What can you do to decrease your risk  To reduce your risk of heart disease:  · Get regular checkups with your doctor.  · Take your medicines for blood pressure, cholesterol or diabetes as directed.  · Watch your diet. Eat a heart healthy diet choosing fresh foods, less salt, cholesterol, and fat  · Stop smoking. Get help if needed.  · Get regular exercise.  · Manage stress.  · Carry a list of medicines and doses in your wallet.  Date Last Reviewed: 12/30/2015  © 6486-0448 [employer]. [address] [place] [address]. All rights reserved. This information is not intended as a substitute for professional medical care. Always follow your healthcare professional's instructions.        Controlling High Blood Pressure  High blood pressure (hypertension) is often called the silent killer. This is because many people who have it dont know it. High blood pressure is defined as 140/90 mm Hg or higher. Know your blood pressure and remember to check it regularly. Doing so can save your life. Here are some things you can do to help control your blood pressure.    Choose heart-healthy foods  · Select low-salt, low-fat foods. Limit sodium intake to 2,400 mg per day or the amount suggested by your healthcare provider.  · Limit canned, dried, cured, packaged, and fast foods. These can contain a lot of salt.  · Eat 8 to 10 servings of fruits and vegetables every  day.  · Choose lean meats, fish, or chicken.  · Eat whole-grain pasta, brown rice, and beans.  · Eat 2 to 3 servings of low-fat or fat-free dairy products.  · Ask your doctor about the DASH eating plan. This plan helps reduce blood pressure.  · When you go to a restaurant, ask that your meal be prepared with no added salt.  Maintain a healthy weight  · Ask your healthcare provider how many calories to eat a day. Then stick to that number.  · Ask your healthcare provider what weight range is healthiest for you. If you are overweight, a weight loss of only 3% to 5% of your body weight can help lower blood pressure. Generally, a good weight loss goal is to lose 10% of your body weight in a year.  · Limit snacks and sweets.  · Get regular exercise.  Get up and get active  · Choose activities you enjoy. Find ones you can do with friends or family. This includes bicycling, dancing, walking, and jogging.  · Park farther away from building entrances.  · Use stairs instead of the elevator.  · When you can, walk or bike instead of driving.  · [place] leaves, garden, or do household repairs.  · Be active at a moderate to vigorous level of physical activity for at least 40 minutes for a minimum of 3 to 4 days a week.   Manage stress  · Make time to relax and enjoy life. Find time to laugh.  · Communicate your concerns with your loved ones and your healthcare provider.  · Visit with family and friends, and keep up with hobbies.  Limit alcohol and quit smoking  · Men should have no more than 2 drinks per day.  · Women should have no more than 1 drink per day.  · Talk with your healthcare provider about quitting smoking. Smoking significantly increases your risk for heart disease and stroke. Ask your healthcare provider about community smoking cessation programs and other options.  Medicines  If lifestyle changes arent enough, your healthcare provider may prescribe high blood pressure medicine. Take all medicines as prescribed. If  you have any questions about your medicines, ask your healthcare provider before stopping or changing them.   Date Last Reviewed: 4/27/2016  © 1804-6504 [employer]. [address] [place] [address], [place] [address]. All rights reserved. This information is not intended as a substitute for professional medical care. Always follow your healthcare professional's instructions.        Weight Management: Getting Started  Healthy bodies come in all shapes and sizes. Not all bodies are made to be thin. For some people, a healthy weight is higher than the average weight listed on weight charts. Your healthcare provider can help you decide on a healthy weight for you.    Reasons to lose weight  Losing weight can help with some health problems, such as high blood pressure, heart disease, diabetes, sleep apnea, and arthritis. You may also feel more energy.  Set your long-term goal  Your goal doesn't even have to be a specific weight. You may decide on a fitness goal (such as being able to walk 10 miles a week), or a health goal (such as lowering your blood pressure). Choose a goal that is measurable and reasonable, so you know when you've reached it. A goal of reaching a BMI of less than 25 is not always reasonable (or possible).   Make an action plan  Habits dont change overnight. Setting your goals too high can leave you feeling discouraged if you cant reach them. Be realistic. Choose one or two small changes you can make now. Set an action plan for how you are going to make these changes. When you can stick to this plan, keep making a few more small changes. Taking small steps will help you stay on the path to success.  Track your progress  Write down your goals. Then, keep a daily record of your progress. Write down what you eat and how active you are. This record lets you look back on how much youve done. It may also help when youre feeling frustrated. Reward yourself for success. Even if you dont reach every  goal, give yourself credit for what you do get done.  Get support  Encouragement from others can help make losing weight easier. Ask your family members and friends for support. They may even want to join you. Also look to your healthcare provider, registered dietitian, and  for help. Your local hospital can give you more information about nutrition, exercise, and weight loss.  Date Last Reviewed: 1/31/2016 © 2000-2017 [employer]. [address] [place] [address], [place] [address]. All rights reserved. This information is not intended as a substitute for professional medical care. Always follow your healthcare professional's instructions.        What is Heart Failure?  The heart is a muscle that pumps oxygen-rich blood to all parts of the body. When you have heart failure, the heart is not able to pump as well as it should. Blood and fluid may back up into the lungs, and some parts of the body dont get enough oxygen-rich blood to work normally. These problems lead to the symptoms you feel.  When you have heart failure  With heart failure, not enough oxygen-rich blood leaves the heart with each beat. There are 2 types of heart failure. Both affect the ventricles ability to pump blood. You may have 1 or both types.       Systolic heart failure. The heart muscle becomes weak and enlarged. It cant pump enough oxygen-rich blood forward to the rest of the body when the ventricles contract. The measurement of how much blood your heart pushes out when it beats is called ejection fraction. In systolic heart failure, the ejection fraction is lower than normal. This can cause blood to back up into the lungs and cause shortness of breath and eventually ankle swelling (edema).  This is also called heart failure with reduced ejection fraction, or  HFrEF.    Diastolic heart failure. The heart muscle becomes stiff. It doesnt relax normally between contractions, which keeps the ventricles from filling  with blood. Ejection fraction is often in the normal range. This can still lead to the backup of blood into the body and affect the organs such as the liver. This is also called heart failure with preserved ejection fraction or HFpEF.     Recognizing heart failure symptoms  When you have heart failure, you need to pay close attention to your body and how you feel, every single day. That way, if a problem occurs, you can get help before it becomes too severe. You'll need to watch for changes in your symptoms. As long as symptoms stay about the same from one day to the next, your heart failure is stable. But if symptoms start to get worse, it's time to take action.  Signs and symptoms of worsening heart failure include:  · Rapid weight gain  · Shortness of breath  · Swelling (edema)  · Fatigue  How heart failure affects your body  When the heart doesn't pump enough blood, hormones (body chemicals) are sent to increase the amount of work the heart does. Some hormones make the heart grow larger. Others tell the heart to pump faster. As a result, the heart may pump more blood at first, but it can't keep up with the ongoing demands. So, the heart muscle becomes even more weak. Over time, even less blood is pumped through the heart. This leads to problems throughout the body as organs began to feel the effects of a long-term lack of oxygen. Eventually, if untreated, this can cause problems with your lungs, liver, kidneys, and loss of elasticity in the skin, and skin changes in the lower legs. A weak heart itself can eventually cause a severe decline in health and possible death if left untreated.  What is ejection fraction?  Ejection fraction (EF) measures how much blood the heart pumps out (ejects). This is measured to help diagnose heart failure. A healthy heart pumps at least half of the blood from the ventricles with each beat. This means a normal ejection fraction is around 50% to 70%. Your doctor will calculate  ejection fraction from an echocardiogram.     My ejection fraction     Date: ______________________  Ejection fraction: _____________  Test used: __________________  Date Last Reviewed: 3/15/2016  © 1429-9528 [employer] [address] [place] [address], [place] [address]. All rights reserved. This information is not intended as a substitute for professional medical care. Always follow your healthcare professional's instructions.

## 2017-12-06 NOTE — PROGRESS NOTES
Subjective:    Patient ID:  Kaiden Forde is a 72 y.o. male who presents for Atrial Fibrillation and Congestive Heart Failure        HPI  DISCUSSED LABS AND GOALS, CR 1.06, , LFT'S OK, DOING OK, SEE ROS    Past Medical History:   Diagnosis Date    Acute coronary syndrome     Arthritis     Atrial fibrillation     Atrial flutter     Cancer     Cardiomyopathy     Carotid artery occlusion     CHF (congestive heart failure)     Coronary artery disease     Diabetes mellitus     Encounter for blood transfusion     GERD (gastroesophageal reflux disease)     Heart murmur     Hyperlipidemia     Hypertension     Hypothyroidism     Legionnaire's disease     Sleep apnea     Stroke     MINI    Valvular regurgitation      Past Surgical History:   Procedure Laterality Date    CARDIAC CATHETERIZATION      CAROTID ENDARTERECTOMY      CORONARY ANGIOPLASTY      CORONARY ARTERY BYPASS GRAFT      CORONARY STENT PLACEMENT      KNEE SURGERY      SHOULDER SURGERY Left      Family History   Problem Relation Age of Onset    Heart disease Brother      Social History     Social History    Marital status:      Spouse name: N/A    Number of children: N/A    Years of education: N/A     Social History Main Topics    Smoking status: Former Smoker     Quit date: 3/11/2001    Smokeless tobacco: Never Used    Alcohol use No    Drug use: No    Sexual activity: No     Other Topics Concern    None     Social History Narrative    None       Review of patient's allergies indicates:  No Known Allergies    Current Outpatient Prescriptions:     aspirin (ECOTRIN) 81 MG EC tablet, Take 81 mg by mouth every evening. , Disp: , Rfl:     atorvastatin (LIPITOR) 40 MG tablet, Take 1 tablet (40 mg total) by mouth every evening., Disp: 90 tablet, Rfl: 1    benazepril (LOTENSIN) 20 MG tablet, TAKE 1 TABLET BY MOUTH  DAILY, Disp: 90 tablet, Rfl: 0    carvedilol (COREG) 12.5 MG tablet, TAKE 1 TABLET BY MOUTH   TWICE A DAY AS DIRECTED, Disp: 180 tablet, Rfl: 0    furosemide (LASIX) 40 MG tablet, Take 60 mg by mouth once daily. , Disp: , Rfl:     levothyroxine (SYNTHROID) 50 MCG tablet, Take 1 tablet by mouth once daily, Disp: 90 tablet, Rfl: 1    metformin (GLUCOPHAGE) 1000 MG tablet, Take 1 tablet by mouth two  times daily, Disp: 180 tablet, Rfl: 1    mv-min-folic acid-lutein (CENTRUM SILVER) 400-250 mcg Chew, Take 1 tablet by mouth once daily at 6am. , Disp: , Rfl:     nitroGLYCERIN (NITROSTAT) 0.4 MG SL tablet, Place 1 tablet (0.4 mg total) under the tongue every 5 (five) minutes as needed for Chest pain., Disp: 25 tablet, Rfl: 0    nitroGLYCERIN 0.2 mg/hr TD PT24 (NITRODUR) 0.2 mg/hr, Apply 1 patch to skin once  a day , remove after 12  hours, Disp: 90 patch, Rfl: 1    omeprazole (PRILOSEC) 20 MG capsule, Take 20 mg by mouth once daily., Disp: , Rfl:     PANTOTHENIC ACID ORAL, Take 1 tablet by mouth once daily at 6am. , Disp: , Rfl:     potassium chloride (KLOR-CON) 10 MEQ TbSR, TAKE 1 TABLET BY MOUTH  EVERY OTHER DAY, Disp: 45 tablet, Rfl: 0    XARELTO 20 mg Tab, TAKE ONE TABLET BY MOUTH EVERY DAY, Disp: 90 tablet, Rfl: 1    zinc 50 mg Tab, Take 1 tablet by mouth once daily at 6am. , Disp: , Rfl:     Review of Systems   Constitution: Positive for weight gain (DIET). Negative for chills, decreased appetite, diaphoresis, fever, weakness and night sweats. Malaise/fatigue: MILD.   HENT: Negative for congestion and nosebleeds.    Eyes: Negative for blurred vision, discharge and visual disturbance.   Cardiovascular: Negative for chest pain, claudication, cyanosis, dyspnea on exertion (MILD, IF SKIPS LASIX), irregular heartbeat (NOT FELT), leg swelling, near-syncope, orthopnea, palpitations, paroxysmal nocturnal dyspnea and syncope.   Respiratory: Negative for cough, hemoptysis, sleep disturbances due to breathing, sputum production and wheezing.    Endocrine: Negative for cold intolerance, heat intolerance,  "polydipsia, polyphagia and polyuria.   Hematologic/Lymphatic: Negative for adenopathy and bleeding problem. Does not bruise/bleed easily.   Skin: Negative for color change, itching and rash.   Musculoskeletal: Negative for back pain and falls.   Gastrointestinal: Negative for abdominal pain, change in bowel habit, dysphagia, heartburn, hematemesis, jaundice, melena and nausea.   Genitourinary: Negative for dysuria, flank pain, frequency and hematuria. Nocturia: ONCE.   Neurological: Negative for brief paralysis, difficulty with concentration, dizziness, focal weakness, light-headedness, loss of balance (OCC), numbness and tremors.   Psychiatric/Behavioral: Negative for altered mental status, depression and substance abuse. The patient is not nervous/anxious. Insomnia: MILD.         Objective:      Vitals:    12/06/17 1024   BP: 112/66   Pulse: 62   Weight: 92.5 kg (203 lb 14.8 oz)   Height: 5' 8" (1.727 m)   PainSc: 0-No pain     Body mass index is 31.01 kg/m².    Physical Exam   Constitutional: He is oriented to person, place, and time. He appears well-developed and well-nourished.   OVERWEIGHT   HENT:   Head: Normocephalic and atraumatic.   Mouth/Throat: Oropharynx is clear and moist.   Eyes: Conjunctivae and EOM are normal. Pupils are equal, round, and reactive to light.   Neck: Neck supple. Normal carotid pulses and no hepatojugular reflux present. Carotid bruit is present. No tracheal deviation present. No thyromegaly present.   Cardiovascular: Normal rate.  An irregular rhythm present. Exam reveals no gallop, no distant heart sounds, no friction rub and no midsystolic click.    Murmur heard.   Blowing holosystolic murmur is present with a grade of 2/6  at the apex   Early diastolic murmur is present with a grade of 1/6  at the upper right sternal border  Pulses:       Carotid pulses are 2+ on the right side with bruit, and 2+ on the left side with bruit.       Radial pulses are 2+ on the right side, and 2+ on " the left side.        Femoral pulses are 2+ on the right side, and 2+ on the left side.       Posterior tibial pulses are 2+ on the right side, and 2+ on the left side.   Pulmonary/Chest: Effort normal and breath sounds normal.   STERNOTOMY SCAR   Abdominal: Soft. Bowel sounds are normal. He exhibits no distension and no mass. There is no hepatosplenomegaly. There is no tenderness. There is no CVA tenderness.   Musculoskeletal: Normal range of motion. He exhibits no edema.   Lymphadenopathy:     He has no cervical adenopathy.   Neurological: He is alert and oriented to person, place, and time. He displays no tremor. No cranial nerve deficit. Coordination normal.   Skin: Skin is warm and dry. No bruising noted. No cyanosis or erythema.   Psychiatric: He has a normal mood and affect. His speech is normal and behavior is normal. Judgment and thought content normal. Cognition and memory are normal.               ..    Chemistry        Component Value Date/Time     06/26/2017 1236    K 4.5 06/26/2017 1236    CL 98 06/26/2017 1236    CO2 30 06/26/2017 1236    BUN 33 (H) 06/26/2017 1236    CREATININE 1.28 06/26/2017 1236     06/26/2017 1236        Component Value Date/Time    CALCIUM 9.9 06/26/2017 1236    ALKPHOS 64 06/26/2017 1236    AST 34 06/26/2017 1236    ALT 45 (H) 06/26/2017 1236    BILITOT 1.0 06/26/2017 1236    ESTGFRAFRICA >60 06/26/2017 1236    EGFRNONAA 55 (A) 06/26/2017 1236            ..  Lab Results   Component Value Date    CHOL 102 (L) 06/26/2017     Lab Results   Component Value Date    HDL 66 06/26/2017     Lab Results   Component Value Date    LDLCALC 27.6 (L) 06/26/2017     Lab Results   Component Value Date    TRIG 42 06/26/2017     Lab Results   Component Value Date    CHOLHDL 64.7 (H) 06/26/2017     ..No results found for: WBC, HGB, HCT, MCV, PLT    Test(s) Reviewed  I have reviewed the following in detail:  [] Stress test   [] Angiography   [] Echocardiogram   [x] Labs   [] Other:        Assessment:         ICD-10-CM ICD-9-CM   1. Left heart failure I50.1 428.1   2. Coronary artery disease involving coronary bypass graft of native heart with angina pectoris I25.709 414.05     413.9   3. Essential hypertension I10 401.9   4. Obesity, Class I, BMI 30-34.9 E66.9 278.00     Problem List Items Addressed This Visit        Cardiac/Vascular    Hypertension    Coronary artery disease involving coronary bypass graft of native heart with angina pectoris    Relevant Orders    NM Myocardial Perfusion Spect Multi Pharmacologic    NM Multi Pharm Stress Cardiac Component    Left heart failure - Primary    Relevant Orders    NM Myocardial Perfusion Spect Multi Pharmacologic    NM Multi Pharm Stress Cardiac Component       Endocrine    Obesity, Class I, BMI 30-34.9           Plan:     DAILY WEIGHT, MILD INCREASE IN SX, CHECK NUCLEAR STRESS FOR ISCHEMIA, HR OK, ALL OTHER  CV CLINICALLY STABLE,  CLASS 1-2  HF, NO TIA, STABLE CLINICAL ARRHYTHMIA, DISCUSSED RFA, ,CONTINUE CURRENT MEDS, EDUCATION, DIET, EXERCISE, WEIGHT LOSS, RTC IN 4 MO      Left heart failure  -     NM Myocardial Perfusion Spect Multi Pharmacologic; Future; Expected date: 12/06/2017  -     NM Multi Pharm Stress Cardiac Component; Future    Coronary artery disease involving coronary bypass graft of native heart with angina pectoris  -     NM Myocardial Perfusion Spect Multi Pharmacologic; Future; Expected date: 12/06/2017  -     NM Multi Pharm Stress Cardiac Component; Future    Essential hypertension    Obesity, Class I, BMI 30-34.9    RTC Low level/low impact aerobic exercise 5x's/wk. Heart healthy diet and risk factor modification.    See labs and med orders.    Aerobic exercise 5x's/wk. Heart healthy diet and risk factor modification.    See labs and med orders.

## 2018-01-08 RX ORDER — NITROGLYCERIN 0.4 MG/1
TABLET SUBLINGUAL
Qty: 25 TABLET | Refills: 0 | Status: SHIPPED | OUTPATIENT
Start: 2018-01-08 | End: 2018-01-09 | Stop reason: SDUPTHER

## 2018-01-08 RX ORDER — POTASSIUM CHLORIDE 750 MG/1
TABLET, EXTENDED RELEASE ORAL
Qty: 45 TABLET | Refills: 0 | Status: SHIPPED | OUTPATIENT
Start: 2018-01-08 | End: 2018-06-07 | Stop reason: SDUPTHER

## 2018-01-08 RX ORDER — ATORVASTATIN CALCIUM 40 MG/1
TABLET, FILM COATED ORAL
Qty: 90 TABLET | Refills: 0 | Status: SHIPPED | OUTPATIENT
Start: 2018-01-08 | End: 2018-04-04 | Stop reason: SDUPTHER

## 2018-01-08 RX ORDER — FUROSEMIDE 40 MG/1
TABLET ORAL
Qty: 135 TABLET | Refills: 0 | Status: ON HOLD | OUTPATIENT
Start: 2018-01-08 | End: 2018-01-18

## 2018-01-09 RX ORDER — NITROGLYCERIN 0.4 MG/1
TABLET SUBLINGUAL
Qty: 25 TABLET | Refills: 3 | Status: SHIPPED | OUTPATIENT
Start: 2018-01-09 | End: 2018-10-05 | Stop reason: SDUPTHER

## 2018-01-11 PROBLEM — D62 ACUTE BLOOD LOSS ANEMIA: Status: ACTIVE | Noted: 2018-01-11

## 2018-01-11 PROBLEM — D64.9 SYMPTOMATIC ANEMIA: Status: ACTIVE | Noted: 2018-01-11

## 2018-01-11 PROBLEM — K92.2 UPPER GI BLEED: Status: ACTIVE | Noted: 2018-01-11

## 2018-01-13 PROBLEM — R79.89 ELEVATED TROPONIN I LEVEL: Status: ACTIVE | Noted: 2018-01-13

## 2018-01-15 PROBLEM — I48.91 A-FIB: Status: ACTIVE | Noted: 2017-03-23

## 2018-01-17 PROBLEM — K57.91 DIVERTICULOSIS OF INTESTINE WITH BLEEDING: Status: ACTIVE | Noted: 2018-01-17

## 2018-01-18 ENCOUNTER — TELEPHONE (OUTPATIENT)
Dept: CARDIOLOGY | Facility: CLINIC | Age: 73
End: 2018-01-18

## 2018-01-18 DIAGNOSIS — Z79.01 LONG TERM (CURRENT) USE OF ANTICOAGULANTS: Primary | ICD-10-CM

## 2018-01-18 NOTE — TELEPHONE ENCOUNTER
----- Message from Cesar Apodaca RN sent at 1/18/2018 12:13 PM CST -----  PATIENT TO BE DC FROM [facility] TODAY. On Coumadin. Please call patient to schedule follow up with Dr. Toure. Thanks.

## 2018-01-22 ENCOUNTER — OFFICE VISIT (OUTPATIENT)
Dept: CARDIOLOGY | Facility: CLINIC | Age: 73
End: 2018-01-22
Payer: MEDICARE

## 2018-01-22 VITALS
HEIGHT: 68 IN | BODY MASS INDEX: 30.44 KG/M2 | WEIGHT: 200.81 LBS | DIASTOLIC BLOOD PRESSURE: 86 MMHG | SYSTOLIC BLOOD PRESSURE: 132 MMHG

## 2018-01-22 DIAGNOSIS — I25.709 CORONARY ARTERY DISEASE INVOLVING CORONARY BYPASS GRAFT OF NATIVE HEART WITH ANGINA PECTORIS: ICD-10-CM

## 2018-01-22 DIAGNOSIS — E66.9 OBESITY, CLASS I, BMI 30-34.9: ICD-10-CM

## 2018-01-22 DIAGNOSIS — I50.1 LEFT HEART FAILURE: Primary | ICD-10-CM

## 2018-01-22 DIAGNOSIS — Z79.01 LONG TERM (CURRENT) USE OF ANTICOAGULANTS: ICD-10-CM

## 2018-01-22 DIAGNOSIS — I48.20 CHRONIC ATRIAL FIBRILLATION: ICD-10-CM

## 2018-01-22 PROCEDURE — 99999 PR PBB SHADOW E&M-EST. PATIENT-LVL III: CPT | Mod: PBBFAC,,, | Performed by: INTERNAL MEDICINE

## 2018-01-22 PROCEDURE — 99213 OFFICE O/P EST LOW 20 MIN: CPT | Mod: PBBFAC,PO | Performed by: INTERNAL MEDICINE

## 2018-01-22 PROCEDURE — 99214 OFFICE O/P EST MOD 30 MIN: CPT | Mod: S$PBB,,, | Performed by: INTERNAL MEDICINE

## 2018-01-22 RX ORDER — BENAZEPRIL HYDROCHLORIDE 10 MG/1
10 TABLET ORAL NIGHTLY
Qty: 30 TABLET | Refills: 1 | Status: SHIPPED | OUTPATIENT
Start: 2018-01-22 | End: 2018-03-12 | Stop reason: SDUPTHER

## 2018-01-22 NOTE — PROGRESS NOTES
Subjective:    Patient ID:  Kaiden Forde is a 72 y.o. male who presents for Hospital Follow Up and Coronary Artery Disease        HPI  S/P STPH WITH SEVERE GIB, RECORDS REVIEWED, CAUTERIZED SOURCE OF BLEEDING IN INTESTINES,HAD MULTIPLE BLOOD TRANSFUSIONS,  LAST HB 9.6, DOES NOT LIKE COUMADIN,SEE ROS    Past Medical History:   Diagnosis Date    Acute coronary syndrome     Arthritis     Atrial fibrillation     Atrial flutter     Cancer     Cardiomyopathy     Carotid artery occlusion     CHF (congestive heart failure)     Coronary artery disease     Diabetes mellitus     Encounter for blood transfusion     GERD (gastroesophageal reflux disease)     Heart murmur     Hyperlipidemia     Hypertension     Hypothyroidism     Legionnaire's disease     Sleep apnea     Stroke     MINI    Valvular regurgitation      Past Surgical History:   Procedure Laterality Date    CARDIAC CATHETERIZATION      CAROTID ENDARTERECTOMY      COLONOSCOPY N/A 1/17/2018    Procedure: COLONOSCOPY;  Surgeon: Sachin Miguel MD;  Location: [facility] ENDO;  Service: Endoscopy;  Laterality: N/A;    CORONARY ANGIOPLASTY      CORONARY ARTERY BYPASS GRAFT      CORONARY STENT PLACEMENT      KNEE SURGERY      SHOULDER SURGERY Left      Family History   Problem Relation Age of Onset    Heart disease Brother      Social History     Social History    Marital status:      Spouse name: N/A    Number of children: N/A    Years of education: N/A     Social History Main Topics    Smoking status: Former Smoker     Quit date: 3/11/2001    Smokeless tobacco: Never Used    Alcohol use No    Drug use: No    Sexual activity: No     Other Topics Concern    Not on file     Social History Narrative    No narrative on file       Review of patient's allergies indicates:  No Known Allergies    Current Outpatient Prescriptions:     atorvastatin (LIPITOR) 40 MG tablet, TAKE 1 TABLET BY MOUTH  EVERY EVENING, Disp: 90 tablet,  Rfl: 0    carvedilol (COREG) 12.5 MG tablet, Take 1 tablet (12.5 mg total) by mouth 2 (two) times daily., Disp: 180 tablet, Rfl: 1    ferrous sulfate 324 mg (65 mg iron) TbEC, Take 1 tablet (324 mg total) by mouth 2 (two) times daily., Disp: 60 tablet, Rfl: 1    furosemide (LASIX) 40 MG tablet, Take 1 tablet (40 mg total) by mouth once daily., Disp: 30 tablet, Rfl: 0    levothyroxine (SYNTHROID) 50 MCG tablet, TAKE 1 TABLET BY MOUTH ONCE DAILY, Disp: 90 tablet, Rfl: 1    metFORMIN (GLUCOPHAGE) 1000 MG tablet, TAKE 1 TABLET BY MOUTH TWO  TIMES DAILY, Disp: 180 tablet, Rfl: 1    mv-min-folic acid-lutein (CENTRUM SILVER) 400-250 mcg Chew, Take 1 tablet by mouth once daily at 6am. , Disp: , Rfl:     nitroGLYCERIN (NITROSTAT) 0.4 MG SL tablet, DISSOLVE 1 TABLET UNDER THE TONGUE EVERY 5 MINUTES FOR  CHEST PAIN UP TO 3 TABLETS  IN 15 MINUTES, Disp: 25 tablet, Rfl: 3    nitroGLYCERIN 0.2 mg/hr TD PT24 (NITRODUR) 0.2 mg/hr, Apply 1 patch to skin once  a day , remove after 12  hours, Disp: 90 patch, Rfl: 1    pantoprazole (PROTONIX) 40 MG tablet, Take 1 tablet (40 mg total) by mouth once daily., Disp: 30 tablet, Rfl: 1    polyethylene glycol (GLYCOLAX) 17 gram PwPk, Take 17 g by mouth once daily., Disp: , Rfl: 0    potassium chloride (KLOR-CON) 10 MEQ TbSR, TAKE 1 TABLET BY MOUTH  EVERY OTHER DAY, Disp: 45 tablet, Rfl: 0    warfarin (COUMADIN) 2.5 MG tablet, Take 1 tablet (2.5 mg total) by mouth Daily., Disp: 30 tablet, Rfl: 1    benazepril (LOTENSIN) 10 MG tablet, Take 1 tablet (10 mg total) by mouth every evening., Disp: 30 tablet, Rfl: 1    Review of Systems   Constitution: Positive for malaise/fatigue (MILD). Negative for chills, decreased appetite, diaphoresis, fever, weakness and night sweats. Weight gain: DIET.   HENT: Negative for congestion and nosebleeds.    Eyes: Negative for blurred vision, discharge and visual disturbance.   Cardiovascular: Positive for dyspnea on exertion (MILD, MODERATE). Negative  "for chest pain, claudication, cyanosis, irregular heartbeat (NOT FELT), leg swelling, near-syncope, orthopnea, palpitations, paroxysmal nocturnal dyspnea and syncope.   Respiratory: Negative for cough, hemoptysis, sputum production and wheezing.    Endocrine: Negative for cold intolerance, heat intolerance, polydipsia, polyphagia and polyuria.   Hematologic/Lymphatic: Negative for adenopathy and bleeding problem. Does not bruise/bleed easily.   Skin: Negative for color change, itching and rash.   Musculoskeletal: Negative for back pain and falls.   Gastrointestinal: Negative for abdominal pain, change in bowel habit, dysphagia, heartburn, hematemesis, jaundice, melena and nausea.   Genitourinary: Negative for dysuria, flank pain, frequency and hematuria. Nocturia: ONCE.   Neurological: Negative for brief paralysis, difficulty with concentration, dizziness, focal weakness, light-headedness, loss of balance (OCC), numbness and tremors.   Psychiatric/Behavioral: Negative for altered mental status, depression and substance abuse. The patient is not nervous/anxious. Insomnia: MILD.         Objective:      Vitals:    01/22/18 0955   BP: 132/86   Weight: 91.1 kg (200 lb 13.4 oz)   Height: 5' 8" (1.727 m)   PainSc: 0-No pain     Body mass index is 30.54 kg/m².    Physical Exam   Constitutional: He is oriented to person, place, and time. He appears well-developed and well-nourished.   OVERWEIGHT   HENT:   Head: Normocephalic and atraumatic.   Mouth/Throat: Oropharynx is clear and moist.   Eyes: Conjunctivae and EOM are normal. Pupils are equal, round, and reactive to light.   Neck: Neck supple. Normal carotid pulses and no hepatojugular reflux present. Carotid bruit is present. No tracheal deviation present. No thyromegaly present.   Cardiovascular: Normal rate.  An irregular rhythm present. Exam reveals no gallop, no distant heart sounds, no friction rub and no midsystolic click.    Murmur heard.   Blowing holosystolic " murmur is present with a grade of 2/6  at the apex   Early diastolic murmur is present with a grade of 1/6  at the upper right sternal border  Pulses:       Carotid pulses are 2+ on the right side with bruit, and 2+ on the left side with bruit.       Radial pulses are 2+ on the right side, and 2+ on the left side.        Femoral pulses are 2+ on the right side, and 2+ on the left side.       Posterior tibial pulses are 2+ on the right side, and 2+ on the left side.   Pulmonary/Chest: Effort normal and breath sounds normal.   STERNOTOMY SCAR   Abdominal: Soft. Bowel sounds are normal. He exhibits no distension and no mass. There is no hepatosplenomegaly. There is no tenderness. There is no CVA tenderness.   Musculoskeletal: Normal range of motion. He exhibits no edema.   TRACE EDEMA   Lymphadenopathy:     He has no cervical adenopathy.   Neurological: He is alert and oriented to person, place, and time. He displays no tremor. No cranial nerve deficit. Coordination normal.   Skin: Skin is warm and dry. No bruising noted. No cyanosis or erythema.   Psychiatric: He has a normal mood and affect. His speech is normal and behavior is normal. Judgment and thought content normal. Cognition and memory are normal.               ..    Chemistry        Component Value Date/Time     (L) 01/12/2018 0557    K 4.0 01/12/2018 0557    CL 98 01/12/2018 0557    CO2 24 01/12/2018 0557    BUN 48 (H) 01/12/2018 0557    CREATININE 1.24 01/12/2018 0557     01/12/2018 0557        Component Value Date/Time    CALCIUM 8.8 01/12/2018 0557    ALKPHOS 41 01/12/2018 0557    AST 32 01/12/2018 0557    ALT 29 01/12/2018 0557    BILITOT 1.1 01/12/2018 0557    ESTGFRAFRICA >60 01/12/2018 0557    EGFRNONAA 57 (A) 01/12/2018 0557            ..  Lab Results   Component Value Date    CHOL 102 (L) 06/26/2017     Lab Results   Component Value Date    HDL 66 06/26/2017     Lab Results   Component Value Date    LDLCALC 27.6 (L) 06/26/2017     Lab  Results   Component Value Date    TRIG 42 06/26/2017     Lab Results   Component Value Date    CHOLHDL 64.7 (H) 06/26/2017     ..  Lab Results   Component Value Date    WBC 4.87 01/18/2018    HGB 9.6 (L) 01/18/2018    HCT 28.9 (L) 01/18/2018    MCV 88 01/18/2018     (L) 01/18/2018       Test(s) Reviewed  I have reviewed the following in detail:  [] Stress test   [] Angiography   [] Echocardiogram   [x] Labs   [x] Other:       Assessment:         ICD-10-CM ICD-9-CM   1. Left heart failure I50.1 428.1   2. Coronary artery disease involving coronary bypass graft of native heart with angina pectoris I25.709 414.05     413.9   3. Chronic atrial fibrillation I48.2 427.31   4. Long term (current) use of anticoagulants Z79.01 V58.61   5. Obesity, Class I, BMI 30-34.9 E66.9 278.00     Problem List Items Addressed This Visit        Cardiac/Vascular    Coronary artery disease involving coronary bypass graft of native heart with angina pectoris    Relevant Orders    Comprehensive metabolic panel    NM Myocardial Perfusion Spect Multi Pharmacologic    NM Multi Pharm Stress Cardiac Component    A-fib    Relevant Orders    Comprehensive metabolic panel    Left heart failure - Primary    Relevant Orders    Comprehensive metabolic panel    NM Myocardial Perfusion Spect Multi Pharmacologic    NM Multi Pharm Stress Cardiac Component       Hematology    Long term (current) use of anticoagulants    Relevant Orders    Comprehensive metabolic panel    Hemoglobin       Endocrine    Obesity, Class I, BMI 30-34.9    Relevant Orders    Comprehensive metabolic panel           Plan:     DAILY WEIGHT, EXPLAINED 2LBS/D, 5/W, SX MIXED PARTIALLY WITH ANEMIA, WILL ASSESS FOR ISCHEMIA, ALL OTHER  CV CLINICALLY STABLE, SATBLE ANGINA, NCLASS HF, NO TIA, NO CLINICAL ARRHYTHMIA,ADD ACE-I, , EDUCATION, DIET, EXERCISE      Left heart failure  -     Comprehensive metabolic panel; Future; Expected date: 01/22/2018  -     NM Myocardial Perfusion Spect  Multi Pharmacologic; Future; Expected date: 01/22/2018  -     NM Multi Pharm Stress Cardiac Component; Future    Coronary artery disease involving coronary bypass graft of native heart with angina pectoris  -     Comprehensive metabolic panel; Future; Expected date: 01/22/2018  -     NM Myocardial Perfusion Spect Multi Pharmacologic; Future; Expected date: 01/22/2018  -     NM Multi Pharm Stress Cardiac Component; Future    Chronic atrial fibrillation  -     Comprehensive metabolic panel; Future; Expected date: 01/22/2018    Long term (current) use of anticoagulants  -     Comprehensive metabolic panel; Future; Expected date: 01/22/2018  -     Hemoglobin; Future; Expected date: 01/22/2018    Obesity, Class I, BMI 30-34.9  -     Comprehensive metabolic panel; Future; Expected date: 01/22/2018    Other orders  -     benazepril (LOTENSIN) 10 MG tablet; Take 1 tablet (10 mg total) by mouth every evening.  Dispense: 30 tablet; Refill: 1    RTC Low level/low impact aerobic exercise 5x's/wk. Heart healthy diet and risk factor modification.    See labs and med orders.    Aerobic exercise 5x's/wk. Heart healthy diet and risk factor modification.    See labs and med orders.

## 2018-02-16 DIAGNOSIS — I25.10 CORONARY ARTERY DISEASE, ANGINA PRESENCE UNSPECIFIED, UNSPECIFIED VESSEL OR LESION TYPE, UNSPECIFIED WHETHER NATIVE OR TRANSPLANTED HEART: Primary | ICD-10-CM

## 2018-02-26 RX ORDER — FUROSEMIDE 40 MG/1
TABLET ORAL
Qty: 135 TABLET | Refills: 0 | Status: SHIPPED | OUTPATIENT
Start: 2018-02-26 | End: 2018-04-04 | Stop reason: DRUGHIGH

## 2018-03-12 RX ORDER — BENAZEPRIL HYDROCHLORIDE 10 MG/1
10 TABLET ORAL NIGHTLY
Qty: 30 TABLET | Refills: 1 | Status: SHIPPED | OUTPATIENT
Start: 2018-03-12 | End: 2018-04-04 | Stop reason: SDUPTHER

## 2018-03-12 RX ORDER — PANTOPRAZOLE SODIUM 40 MG/1
40 TABLET, DELAYED RELEASE ORAL DAILY
Qty: 30 TABLET | Refills: 1 | Status: SHIPPED | OUTPATIENT
Start: 2018-03-12 | End: 2018-04-16 | Stop reason: SDUPTHER

## 2018-03-12 NOTE — TELEPHONE ENCOUNTER
----- Message from Linh Catalan sent at 3/12/2018 12:52 PM CDT -----  Contact: patient   Patient calling to requesting a new prescription for pantoprazole (PROTONIX) 40 MG tablet. Please advise.   Call back  leave message if no answer.   Thanks!    [facility]/pharmacy #[id number] [address] [address]  Phone: [phone] Fax: [phone]

## 2018-03-27 ENCOUNTER — HOSPITAL ENCOUNTER (OUTPATIENT)
Dept: RADIOLOGY | Facility: HOSPITAL | Age: 73
Discharge: HOME | End: 2018-03-27
Attending: INTERNAL MEDICINE
Payer: MEDICARE

## 2018-03-27 ENCOUNTER — CLINICAL SUPPORT (OUTPATIENT)
Dept: CARDIOLOGY | Facility: CLINIC | Age: 73
End: 2018-03-27
Attending: INTERNAL MEDICINE
Payer: MEDICARE

## 2018-03-27 DIAGNOSIS — I50.1 LEFT HEART FAILURE: ICD-10-CM

## 2018-03-27 DIAGNOSIS — I25.709 CORONARY ARTERY DISEASE INVOLVING CORONARY BYPASS GRAFT OF NATIVE HEART WITH ANGINA PECTORIS: ICD-10-CM

## 2018-03-27 PROCEDURE — 78452 HT MUSCLE IMAGE SPECT MULT: CPT | Mod: 26,,, | Performed by: INTERNAL MEDICINE

## 2018-03-27 PROCEDURE — 93016 CV STRESS TEST SUPVJ ONLY: CPT | Mod: S$PBB,,, | Performed by: INTERNAL MEDICINE

## 2018-03-27 PROCEDURE — 93018 CV STRESS TEST I&R ONLY: CPT | Mod: S$PBB,,, | Performed by: INTERNAL MEDICINE

## 2018-03-27 PROCEDURE — 93017 CV STRESS TEST TRACING ONLY: CPT | Mod: PBBFAC,PO | Performed by: INTERNAL MEDICINE

## 2018-03-27 PROCEDURE — 78452 HT MUSCLE IMAGE SPECT MULT: CPT | Mod: TC,PO

## 2018-03-28 LAB — DIASTOLIC DYSFUNCTION: NO

## 2018-04-04 ENCOUNTER — OFFICE VISIT (OUTPATIENT)
Dept: CARDIOLOGY | Facility: CLINIC | Age: 73
End: 2018-04-04
Payer: MEDICARE

## 2018-04-04 ENCOUNTER — TELEPHONE (OUTPATIENT)
Dept: CARDIOLOGY | Facility: CLINIC | Age: 73
End: 2018-04-04

## 2018-04-04 VITALS
HEIGHT: 68 IN | DIASTOLIC BLOOD PRESSURE: 60 MMHG | WEIGHT: 200.81 LBS | BODY MASS INDEX: 30.44 KG/M2 | HEART RATE: 73 BPM | OXYGEN SATURATION: 94 % | SYSTOLIC BLOOD PRESSURE: 104 MMHG

## 2018-04-04 DIAGNOSIS — E66.9 OBESITY, CLASS I, BMI 30-34.9: ICD-10-CM

## 2018-04-04 DIAGNOSIS — I50.1 LEFT HEART FAILURE: ICD-10-CM

## 2018-04-04 DIAGNOSIS — I25.709 CORONARY ARTERY DISEASE INVOLVING CORONARY BYPASS GRAFT OF NATIVE HEART WITH ANGINA PECTORIS: ICD-10-CM

## 2018-04-04 DIAGNOSIS — Z79.01 LONG TERM (CURRENT) USE OF ANTICOAGULANTS: ICD-10-CM

## 2018-04-04 DIAGNOSIS — R94.39 ABNORMAL NUCLEAR STRESS TEST: Primary | ICD-10-CM

## 2018-04-04 PROCEDURE — 99214 OFFICE O/P EST MOD 30 MIN: CPT | Mod: S$GLB,,, | Performed by: INTERNAL MEDICINE

## 2018-04-04 RX ORDER — SODIUM CHLORIDE 9 MG/ML
INJECTION, SOLUTION INTRAVENOUS ONCE
Status: CANCELLED | OUTPATIENT
Start: 2018-04-04 | End: 2018-04-04

## 2018-04-04 RX ORDER — FUROSEMIDE 40 MG/1
40 TABLET ORAL DAILY
COMMUNITY
End: 2018-04-04 | Stop reason: SDUPTHER

## 2018-04-04 RX ORDER — BENAZEPRIL HYDROCHLORIDE 10 MG/1
10 TABLET ORAL NIGHTLY
Qty: 30 TABLET | Refills: 1 | Status: SHIPPED | OUTPATIENT
Start: 2018-04-04 | End: 2018-05-08 | Stop reason: SDUPTHER

## 2018-04-04 RX ORDER — CARVEDILOL 12.5 MG/1
12.5 TABLET ORAL 2 TIMES DAILY
Qty: 180 TABLET | Refills: 1 | Status: SHIPPED | OUTPATIENT
Start: 2018-04-04 | End: 2018-05-30 | Stop reason: SDUPTHER

## 2018-04-04 RX ORDER — ATORVASTATIN CALCIUM 40 MG/1
40 TABLET, FILM COATED ORAL NIGHTLY
Qty: 90 TABLET | Refills: 1 | Status: SHIPPED | OUTPATIENT
Start: 2018-04-04 | End: 2018-10-26 | Stop reason: SDUPTHER

## 2018-04-04 RX ORDER — FUROSEMIDE 40 MG/1
40 TABLET ORAL DAILY
Qty: 90 TABLET | Refills: 1 | Status: SHIPPED | OUTPATIENT
Start: 2018-04-04 | End: 2018-10-26 | Stop reason: SDUPTHER

## 2018-04-04 RX ORDER — NITROGLYCERIN 40 MG/1
PATCH TRANSDERMAL
Qty: 90 PATCH | Refills: 1 | Status: SHIPPED | OUTPATIENT
Start: 2018-04-04 | End: 2018-10-05 | Stop reason: SDUPTHER

## 2018-04-04 NOTE — PATIENT INSTRUCTIONS
Angiogram    Arrive for procedure at: [facility] Tues May 1st arrive 10am for 12 noon angiogram    You will receive a phone call from [facility] Pre-Op Department with further instructions prior to your scheduled procedure.    Notify the nurse if you are ALLERGIC TO IODINE.    FASTING: You MAY NOT have anything to eat or drink AFTER MIDNIGHT the day before your procedure. If your procedure is scheduled in the afternoon, you may have a LIGHT BREAKFAST 6-8 hours prior to your procedure.  For example: Two slices of toast; black coffee or black tea.    MEDICATIONS: You may take your regular morning medications with water. If there are any medications that you should not take, you will be instructed to hold them for that morning.    ? CARDIOLOGY PRE-PROCEDURE MEDICATION ORDERS:  ** Please hold any medications that are checked below:    HOLD   # OF DAYS TO HOLD  ? Coumadin   Consult with Coumadin Clinic   ? Xarelto    _DAY BEFORE & DAY OF_  ? Pradaxa  _ DAY BEFORE & DAY OF _  ? Eliquis   _ DAY BEFORE & DAY OF _  ? Metformin    Day before procedure & morning of procedure  ? Short acting insulin   Morning of procedure    CONTINUE the Following Medications   ? Plavix      ? Effient     ? Aspirin        WHAT TO EXPECT:    How long will the procedure take?  The procedure will take an average of 1 - 2 hours to perform.  After the procedure, you will need to lay flat for around 4 - 6 hours to minimize bleeding from the puncture site. If the wrist is accessed you will need to keep your arm still as instructed by the nurse.    When can I go home?  You may be able to be discharged home that same afternoon if there were no complications.  If you have one of the following: balloon; stent; pacemaker or defibrillator procedures, you may spend one night for observation.  Your doctor will determine your discharge based upon your progress.  The results of your procedure will be discussed with you before you  are discharged.  Any further testing or procedures will be scheduled for you either before you leave or you will be instructed to call for a future appointment.      TRANSPORTATION:  PLEASE ARRANGE TO HAVE SOMEONE DRIVE YOU HOME FOLLOWING YOUR PROCEDURE, YOU WILL NOT BE ALLOWED TO DRIVE.

## 2018-04-04 NOTE — TELEPHONE ENCOUNTER
Patient scheduled for a left heart cath on may 1st 12 noon at [facility]. Please advise patient on coumadin- Dr. Toure wants to see of he needs lovenox bridge. Please advise

## 2018-04-04 NOTE — PROGRESS NOTES
Subjective:    Patient ID:  Kaiden Forde is a 72 y.o. male who presents for Congestive Heart Failure; Coronary Artery Disease; Hypertension; and Atrial Fibrillation        HPI  DISCUSSED TESTS, ABN STRESS WITH SCARS, SOME ISCHEMIA, EF 24%, SOME FATIGUE, SEE ROS    Past Medical History:   Diagnosis Date    Acute coronary syndrome     Arthritis     Atrial fibrillation     Atrial flutter     Cancer     Cardiomyopathy     Carotid artery occlusion     CHF (congestive heart failure)     Coronary artery disease     Diabetes mellitus     Encounter for blood transfusion     GERD (gastroesophageal reflux disease)     Heart murmur     Hyperlipidemia     Hypertension     Hypothyroidism     Legionnaire's disease     Sleep apnea     Stroke     MINI    Valvular regurgitation      Past Surgical History:   Procedure Laterality Date    CARDIAC CATHETERIZATION      CAROTID ENDARTERECTOMY      COLONOSCOPY N/A 1/17/2018    Procedure: COLONOSCOPY;  Surgeon: Sachin Miguel MD;  Location: [facility];  Service: Endoscopy;  Laterality: N/A;    CORONARY ANGIOPLASTY      CORONARY ARTERY BYPASS GRAFT      CORONARY STENT PLACEMENT      KNEE SURGERY      SHOULDER SURGERY Left      Family History   Problem Relation Age of Onset    Heart disease Brother      Social History     Social History    Marital status:      Spouse name: N/A    Number of children: N/A    Years of education: N/A     Social History Main Topics    Smoking status: Former Smoker     Quit date: 3/11/2001    Smokeless tobacco: Never Used    Alcohol use No    Drug use: No    Sexual activity: No     Other Topics Concern    None     Social History Narrative    None       Review of patient's allergies indicates:  No Known Allergies    Current Outpatient Prescriptions:     atorvastatin (LIPITOR) 40 MG tablet, Take 1 tablet (40 mg total) by mouth every evening., Disp: 90 tablet, Rfl: 1    benazepril (LOTENSIN) 10 MG tablet,  Take 1 tablet (10 mg total) by mouth every evening., Disp: 30 tablet, Rfl: 1    carvedilol (COREG) 12.5 MG tablet, Take 1 tablet (12.5 mg total) by mouth 2 (two) times daily., Disp: 180 tablet, Rfl: 1    ferrous sulfate 324 mg (65 mg iron) TbEC, Take 1 tablet (324 mg total) by mouth 2 (two) times daily., Disp: 60 tablet, Rfl: 1    furosemide (LASIX) 40 MG tablet, Take 1 tablet (40 mg total) by mouth once daily., Disp: 90 tablet, Rfl: 1    levothyroxine (SYNTHROID) 50 MCG tablet, TAKE 1 TABLET BY MOUTH ONCE DAILY, Disp: 90 tablet, Rfl: 1    metFORMIN (GLUCOPHAGE) 1000 MG tablet, TAKE 1 TABLET BY MOUTH TWO  TIMES DAILY, Disp: 180 tablet, Rfl: 1    mv-min-folic acid-lutein (CENTRUM SILVER) 400-250 mcg Chew, Take 1 tablet by mouth once daily at 6am. , Disp: , Rfl:     nitroGLYCERIN (NITROSTAT) 0.4 MG SL tablet, DISSOLVE 1 TABLET UNDER THE TONGUE EVERY 5 MINUTES FOR  CHEST PAIN UP TO 3 TABLETS  IN 15 MINUTES, Disp: 25 tablet, Rfl: 3    nitroGLYCERIN 0.2 mg/hr TD PT24 (NITRODUR) 0.2 mg/hr, Apply 1 patch to skin once  a day , remove after 12  hours, Disp: 90 patch, Rfl: 1    pantoprazole (PROTONIX) 40 MG tablet, Take 1 tablet (40 mg total) by mouth once daily., Disp: 30 tablet, Rfl: 1    polyethylene glycol (GLYCOLAX) 17 gram PwPk, Take 17 g by mouth once daily., Disp: 30 packet, Rfl: 6    potassium chloride (KLOR-CON) 10 MEQ TbSR, TAKE 1 TABLET BY MOUTH  EVERY OTHER DAY, Disp: 45 tablet, Rfl: 0    warfarin (COUMADIN) 2.5 MG tablet, Take 1-1.5 tablets (2.5-3.75 mg total) by mouth Daily. (Patient taking differently: Take 2.5-3.75 mg by mouth Daily. This medication is managed by [facility]. Please contact [phone]. See most recent anticoag visit for current coumadin dosing. Current dose:  2.5 mg Sun Thurs, 3.75 mg all other days), Disp: 45 tablet, Rfl: 6    Review of Systems   Constitution: Positive for malaise/fatigue (MILD). Negative for chills, decreased appetite, diaphoresis, fever, weakness and  "night sweats. Weight gain: DIET.   HENT: Negative for congestion and nosebleeds.    Eyes: Negative for blurred vision, discharge and visual disturbance.   Cardiovascular: Negative for chest pain, claudication, cyanosis, dyspnea on exertion (MILD, MODERATE), irregular heartbeat (NOT FELT), leg swelling, near-syncope, orthopnea, palpitations, paroxysmal nocturnal dyspnea and syncope.   Respiratory: Negative for cough, hemoptysis and wheezing. Shortness of breath: SOME.    Endocrine: Negative for cold intolerance, heat intolerance, polydipsia, polyphagia and polyuria.   Hematologic/Lymphatic: Negative for adenopathy and bleeding problem. Does not bruise/bleed easily.   Skin: Negative for color change, itching and rash.   Musculoskeletal: Negative for back pain, falls and joint swelling.   Gastrointestinal: Negative for abdominal pain, change in bowel habit, dysphagia, heartburn, hematemesis, jaundice, melena and nausea.   Genitourinary: Negative for dysuria, flank pain and frequency. Nocturia: ONCE.   Neurological: Negative for brief paralysis, dizziness, focal weakness, light-headedness, loss of balance (OCC) and tremors.   Psychiatric/Behavioral: Negative for altered mental status, depression and substance abuse. The patient is not nervous/anxious. Insomnia: MILD.    Allergic/Immunologic: Negative for hives and persistent infections.        Objective:      Vitals:    04/04/18 1400   BP: 104/60   Pulse: 73   SpO2: (!) 94%   Weight: 91.1 kg (200 lb 13.4 oz)   Height: 5' 8" (1.727 m)   PainSc: 0-No pain     Body mass index is 30.54 kg/m².    Physical Exam   Constitutional: He is oriented to person, place, and time. He appears well-developed and well-nourished.   OVERWEIGHT   HENT:   Head: Normocephalic and atraumatic.   Mouth/Throat: Oropharynx is clear and moist.   Eyes: Conjunctivae and EOM are normal. Pupils are equal, round, and reactive to light.   Neck: Neck supple. Normal carotid pulses and no hepatojugular " reflux present. Carotid bruit is present. No tracheal deviation present. No thyromegaly present.   Cardiovascular: Normal rate.  An irregular rhythm present. Exam reveals no gallop, no distant heart sounds, no friction rub and no midsystolic click.    Murmur heard.  High-pitched holosystolic murmur is present with a grade of 2/6  at the apex   Early diastolic murmur is present with a grade of 1/6  at the upper right sternal border  Pulses:       Carotid pulses are 2+ on the right side with bruit, and 2+ on the left side with bruit.       Radial pulses are 2+ on the right side, and 2+ on the left side.        Femoral pulses are 2+ on the right side, and 2+ on the left side.       Posterior tibial pulses are 2+ on the right side, and 2+ on the left side.   Pulmonary/Chest: Effort normal and breath sounds normal.   STERNOTOMY SCAR   Abdominal: Soft. Bowel sounds are normal. He exhibits no distension and no mass. There is no hepatosplenomegaly. There is no tenderness. There is no CVA tenderness.   Musculoskeletal: Normal range of motion. He exhibits no edema.   TRACE EDEMA   Lymphadenopathy:     He has no cervical adenopathy.   Neurological: He is alert and oriented to person, place, and time. He displays no tremor. No cranial nerve deficit. Coordination normal.   Skin: Skin is warm and dry. No bruising noted. No cyanosis or erythema.   Psychiatric: He has a normal mood and affect. His speech is normal and behavior is normal. Judgment and thought content normal. Cognition and memory are normal.               ..    Chemistry        Component Value Date/Time     01/30/2018 1345    K 4.7 01/30/2018 1345    CL 99 01/30/2018 1345    CO2 28 01/30/2018 1345    BUN 28 (H) 01/30/2018 1345    CREATININE 1.53 (H) 01/30/2018 1345     (H) 01/30/2018 1345        Component Value Date/Time    CALCIUM 8.8 01/30/2018 1345    ALKPHOS 61 01/30/2018 1345    AST 44 01/30/2018 1345    ALT 46 (H) 01/30/2018 1345    BILITOT 0.5  01/30/2018 1345    ESTGFRAFRICA 51 (A) 01/30/2018 1345    EGFRNONAA 44 (A) 01/30/2018 1345            ..  Lab Results   Component Value Date    CHOL 102 (L) 06/26/2017     Lab Results   Component Value Date    HDL 66 06/26/2017     Lab Results   Component Value Date    LDLCALC 27.6 (L) 06/26/2017     Lab Results   Component Value Date    TRIG 42 06/26/2017     Lab Results   Component Value Date    CHOLHDL 64.7 (H) 06/26/2017     ..  Lab Results   Component Value Date    WBC 5.46 01/30/2018    HGB 10.1 (L) 01/30/2018    HCT 32.4 (L) 01/30/2018    MCV 92 01/30/2018     01/30/2018       Test(s) Reviewed  I have reviewed the following in detail:  [x] Stress test   [] Angiography   [] Echocardiogram   [x] Labs   [] Other:       Assessment:         ICD-10-CM ICD-9-CM   1. Abnormal nuclear stress test R94.39 794.39   2. Coronary artery disease involving coronary bypass graft of native heart with angina pectoris I25.709 414.05     413.9   3. Left heart failure I50.1 428.1   4. Long term (current) use of anticoagulants Z79.01 V58.61     Problem List Items Addressed This Visit        Cardiac/Vascular    Coronary artery disease involving coronary bypass graft of native heart with angina pectoris    Left heart failure    Abnormal nuclear stress test - Primary       Hematology    Long term (current) use of anticoagulants           Plan:     DAILY WEIGHT, WILL NEED CARDIAC CATH, MIGHT NEED AICD, CONSENT, ALL OTHER CV CLINICALLY STABLE, CLASS 2  ANGINA, CLASS 2  HF, NO TIA, NO CLINICAL ARRHYTHMIA,CONTINUE CURRENT MEDS, EDUCATION, DIET, EXERCISE, WEIGHT LOSS, HOLD METFORMIN DAY OF CATH, HOLD COUMADIN 5 DAYS WITH LOVENOX BRIDGING,      Abnormal nuclear stress test    Coronary artery disease involving coronary bypass graft of native heart with angina pectoris    Left heart failure    Long term (current) use of anticoagulants    Other orders  -     benazepril (LOTENSIN) 10 MG tablet; Take 1 tablet (10 mg total) by mouth every  evening.  Dispense: 30 tablet; Refill: 1  -     carvedilol (COREG) 12.5 MG tablet; Take 1 tablet (12.5 mg total) by mouth 2 (two) times daily.  Dispense: 180 tablet; Refill: 1  -     nitroGLYCERIN 0.2 mg/hr TD PT24 (NITRODUR) 0.2 mg/hr; Apply 1 patch to skin once  a day , remove after 12  hours  Dispense: 90 patch; Refill: 1  -     atorvastatin (LIPITOR) 40 MG tablet; Take 1 tablet (40 mg total) by mouth every evening.  Dispense: 90 tablet; Refill: 1  -     furosemide (LASIX) 40 MG tablet; Take 1 tablet (40 mg total) by mouth once daily.  Dispense: 90 tablet; Refill: 1    RTC Low level/low impact aerobic exercise 5x's/wk. Heart healthy diet and risk factor modification.    See labs and med orders.    Aerobic exercise 5x's/wk. Heart healthy diet and risk factor modification.    See labs and med orders.

## 2018-04-16 DIAGNOSIS — K21.00 REFLUX ESOPHAGITIS: Primary | ICD-10-CM

## 2018-04-16 RX ORDER — PANTOPRAZOLE SODIUM 40 MG/1
40 TABLET, DELAYED RELEASE ORAL DAILY
Qty: 90 TABLET | Refills: 1 | Status: SHIPPED | OUTPATIENT
Start: 2018-04-16 | End: 2018-05-08 | Stop reason: SDUPTHER

## 2018-04-26 PROBLEM — I48.91 ATRIAL FIBRILLATION: Status: ACTIVE | Noted: 2018-04-26

## 2018-05-08 DIAGNOSIS — K21.00 REFLUX ESOPHAGITIS: ICD-10-CM

## 2018-05-08 RX ORDER — PANTOPRAZOLE SODIUM 40 MG/1
40 TABLET, DELAYED RELEASE ORAL DAILY
Qty: 90 TABLET | Refills: 1 | Status: SHIPPED | OUTPATIENT
Start: 2018-05-08 | End: 2018-09-18 | Stop reason: SDUPTHER

## 2018-05-08 RX ORDER — BENAZEPRIL HYDROCHLORIDE 10 MG/1
TABLET ORAL
Qty: 30 TABLET | Refills: 1 | Status: SHIPPED | OUTPATIENT
Start: 2018-05-08 | End: 2018-05-30 | Stop reason: SDUPTHER

## 2018-05-30 ENCOUNTER — OFFICE VISIT (OUTPATIENT)
Dept: CARDIOLOGY | Facility: CLINIC | Age: 73
End: 2018-05-30
Payer: MEDICARE

## 2018-05-30 VITALS
WEIGHT: 189.81 LBS | SYSTOLIC BLOOD PRESSURE: 98 MMHG | OXYGEN SATURATION: 98 % | BODY MASS INDEX: 28.77 KG/M2 | HEIGHT: 68 IN | HEART RATE: 70 BPM | DIASTOLIC BLOOD PRESSURE: 62 MMHG

## 2018-05-30 DIAGNOSIS — Z79.01 LONG TERM (CURRENT) USE OF ANTICOAGULANTS: ICD-10-CM

## 2018-05-30 DIAGNOSIS — I25.5 ISCHEMIC CARDIOMYOPATHY: ICD-10-CM

## 2018-05-30 DIAGNOSIS — I50.1 LEFT HEART FAILURE: Primary | ICD-10-CM

## 2018-05-30 DIAGNOSIS — I08.0 MITRAL AND AORTIC REGURGITATION: ICD-10-CM

## 2018-05-30 PROBLEM — E66.9 OBESITY, CLASS I, BMI 30-34.9: Status: RESOLVED | Noted: 2017-12-06 | Resolved: 2018-05-30

## 2018-05-30 PROCEDURE — 99214 OFFICE O/P EST MOD 30 MIN: CPT | Mod: S$GLB,,, | Performed by: INTERNAL MEDICINE

## 2018-05-30 RX ORDER — BENAZEPRIL HYDROCHLORIDE 10 MG/1
10 TABLET ORAL NIGHTLY
Qty: 90 TABLET | Refills: 0 | Status: SHIPPED | OUTPATIENT
Start: 2018-05-30 | End: 2018-08-08 | Stop reason: SDUPTHER

## 2018-05-30 RX ORDER — CARVEDILOL 12.5 MG/1
12.5 TABLET ORAL 2 TIMES DAILY
Qty: 180 TABLET | Refills: 1 | Status: SHIPPED | OUTPATIENT
Start: 2018-05-30 | End: 2018-10-26 | Stop reason: SDUPTHER

## 2018-05-30 RX ORDER — MUPIROCIN 20 MG/G
1 OINTMENT TOPICAL
Status: CANCELLED | OUTPATIENT
Start: 2018-05-30

## 2018-05-30 NOTE — PATIENT INSTRUCTIONS
Angiogram    Arrive for procedure at: [facility] on 6/26/18. Your procedure is scheduled for 9am     You will receive a phone call from [facility] Pre-Op Department with further instructions prior to your scheduled procedure.    Notify the nurse if you are ALLERGIC TO IODINE.    FASTING: You MAY NOT have anything to eat or drink AFTER MIDNIGHT the day before your procedure. If your procedure is scheduled in the afternoon, you may have a LIGHT BREAKFAST 6-8 hours prior to your procedure.  For example: Two slices of toast; black coffee or black tea.    MEDICATIONS: You may take your regular morning medications with water. If there are any medications that you should not take, you will be instructed to hold them for that morning.    ? CARDIOLOGY PRE-PROCEDURE MEDICATION ORDERS:  ** Please hold any medications that are checked below:    HOLD   # OF DAYS TO HOLD  ? Coumadin   Consult with Coumadin Clinic   ? Xarelto    _DAY BEFORE & DAY OF_  ? Pradaxa  _ DAY BEFORE & DAY OF _  ? Eliquis   _ DAY BEFORE & DAY OF _  ? Metformin    Day before procedure & morning of procedure  ? Short acting insulin   Morning of procedure    CONTINUE the Following Medications   ? Plavix      ? Effient     ? Aspirin        WHAT TO EXPECT:    How long will the procedure take?  The procedure will take an average of 1 - 2 hours to perform.  After the procedure, you will need to lay flat for around 4 - 6 hours to minimize bleeding from the puncture site. If the wrist is accessed you will need to keep your arm still as instructed by the nurse.    When can I go home?  You may be able to be discharged home that same afternoon if there were no complications.  If you have one of the following: balloon; stent; pacemaker or defibrillator procedures, you may spend one night for observation.  Your doctor will determine your discharge based upon your progress.  The results of your procedure will be discussed with you before you  are discharged.  Any further testing or procedures will be scheduled for you either before you leave or you will be instructed to call for a future appointment.      TRANSPORTATION:  PLEASE ARRANGE TO HAVE SOMEONE DRIVE YOU HOME FOLLOWING YOUR PROCEDURE, YOU WILL NOT BE ALLOWED TO DRIVE.

## 2018-05-30 NOTE — PROGRESS NOTES
Subjective:    Patient ID:  Kaiden Forde is a 72 y.o. male who presents for Follow-up ([facility]); Congestive Heart Failure; and Coronary Artery Disease        HPI  S/P LHC, SEVERE CAD WITH PATENT MARTINES, BOBBI, EF IN 20'S, HAS BEEN ACTIVE, DISCUSSED FINDINGS , OPTIONS, SEE ROS    Past Medical History:   Diagnosis Date    Acute coronary syndrome     Arthritis     Atrial fibrillation     Atrial flutter     Cancer     Cardiomyopathy     Carotid artery occlusion     CHF (congestive heart failure)     Coronary artery disease     Diabetes mellitus     Encounter for blood transfusion     GERD (gastroesophageal reflux disease)     GERD (gastroesophageal reflux disease)     Heart murmur     Hyperlipidemia     Hypertension     Hypothyroidism     Legionnaire's disease     Sleep apnea     Sleep apnea with use of continuous positive airway pressure (CPAP)     Stroke     MINI    Valvular regurgitation      Past Surgical History:   Procedure Laterality Date    CARDIAC CATHETERIZATION      CAROTID ENDARTERECTOMY      CATARACT EXTRACTION EXTRACAPSULAR W/ INTRAOCULAR LENS IMPLANTATION      COLONOSCOPY N/A 1/17/2018    Procedure: COLONOSCOPY;  Surgeon: Sachin Miguel MD;  Location: [facility];  Service: Endoscopy;  Laterality: N/A;    CORONARY ANGIOPLASTY      CORONARY ARTERY BYPASS GRAFT      CORONARY STENT PLACEMENT      EYE SURGERY      JOINT REPLACEMENT      pauline knees    KNEE SURGERY      SHOULDER SURGERY Left      Family History   Problem Relation Age of Onset    Heart disease Brother      Social History     Social History    Marital status:      Spouse name: N/A    Number of children: N/A    Years of education: N/A     Social History Main Topics    Smoking status: Former Smoker     Quit date: 3/11/2001    Smokeless tobacco: Never Used    Alcohol use No    Drug use: No    Sexual activity: No     Other Topics Concern    Not on file     Social History Narrative    No narrative  on file       Review of patient's allergies indicates:  No Known Allergies    Current Outpatient Prescriptions:     atorvastatin (LIPITOR) 40 MG tablet, Take 1 tablet (40 mg total) by mouth every evening., Disp: 90 tablet, Rfl: 1    benazepril (LOTENSIN) 10 MG tablet, Take 1 tablet (10 mg total) by mouth every evening., Disp: 90 tablet, Rfl: 0    carvedilol (COREG) 12.5 MG tablet, Take 1 tablet (12.5 mg total) by mouth 2 (two) times daily., Disp: 180 tablet, Rfl: 1    ferrous sulfate 324 mg (65 mg iron) TbEC, Take 1 tablet (324 mg total) by mouth 2 (two) times daily., Disp: 60 tablet, Rfl: 1    furosemide (LASIX) 40 MG tablet, Take 1 tablet (40 mg total) by mouth once daily., Disp: 90 tablet, Rfl: 1    levothyroxine (SYNTHROID) 50 MCG tablet, TAKE 1 TABLET BY MOUTH ONCE DAILY, Disp: 90 tablet, Rfl: 1    metFORMIN (GLUCOPHAGE) 1000 MG tablet, TAKE 1 TABLET BY MOUTH TWO  TIMES DAILY, Disp: 180 tablet, Rfl: 1    mv-min-folic acid-lutein (CENTRUM SILVER) 400-250 mcg Chew, Take 1 tablet by mouth once daily at 6am. , Disp: , Rfl:     nitroGLYCERIN (NITROSTAT) 0.4 MG SL tablet, DISSOLVE 1 TABLET UNDER THE TONGUE EVERY 5 MINUTES FOR  CHEST PAIN UP TO 3 TABLETS  IN 15 MINUTES, Disp: 25 tablet, Rfl: 3    nitroGLYCERIN 0.2 mg/hr TD PT24 (NITRODUR) 0.2 mg/hr, Apply 1 patch to skin once  a day , remove after 12  hours, Disp: 90 patch, Rfl: 1    pantoprazole (PROTONIX) 40 MG tablet, Take 1 tablet (40 mg total) by mouth once daily., Disp: 90 tablet, Rfl: 1    potassium chloride (KLOR-CON) 10 MEQ TbSR, TAKE 1 TABLET BY MOUTH  EVERY OTHER DAY, Disp: 45 tablet, Rfl: 0    warfarin (COUMADIN) 2.5 MG tablet, Take 1-1.5 tablets (2.5-3.75 mg total) by mouth Daily. (Patient taking differently: Take 2.5-3.75 mg by mouth Daily. This medication is managed by [facility]. Please contact [phone]. See most recent anticoag visit for current coumadin dosing. Current dose:  2.5 mg Sun Thurs, 3.75 mg all other days), Disp:  "45 tablet, Rfl: 6    enoxaparin (LOVENOX) 40 mg/0.4 mL Syrg, Inject 0.4 mLs (40 mg total) into the skin every morning., Disp: 6 Syringe, Rfl: 0    polyethylene glycol (GLYCOLAX) 17 gram PwPk, Take 17 g by mouth once daily., Disp: 30 packet, Rfl: 6    Review of Systems   Constitution: Negative for chills, decreased appetite, diaphoresis, fever, weakness, malaise/fatigue (MILD) and night sweats. Weight loss: SOME, DIET.   HENT: Negative for congestion and nosebleeds.    Eyes: Negative for blurred vision and visual disturbance.   Cardiovascular: Negative for chest pain, claudication, cyanosis, dyspnea on exertion (MILD, ), irregular heartbeat (NOT FELT), leg swelling, near-syncope, orthopnea, palpitations, paroxysmal nocturnal dyspnea and syncope.   Respiratory: Negative for cough, hemoptysis and wheezing. Shortness of breath: SOME.    Endocrine: Negative for cold intolerance, heat intolerance, polydipsia, polyphagia and polyuria.   Hematologic/Lymphatic: Negative for adenopathy and bleeding problem. Does not bruise/bleed easily.   Skin: Negative for color change, itching and rash.   Musculoskeletal: Negative for back pain, falls and joint swelling.   Gastrointestinal: Negative for abdominal pain, change in bowel habit, dysphagia, heartburn, hematemesis, jaundice and melena.   Genitourinary: Negative for dysuria, flank pain and frequency. Nocturia: ONCE.   Neurological: Negative for brief paralysis, dizziness, focal weakness, light-headedness, loss of balance and tremors.   Psychiatric/Behavioral: Negative for altered mental status and depression. The patient is not nervous/anxious.    Allergic/Immunologic: Negative for hives and persistent infections.        Objective:      Vitals:    05/30/18 1343   BP: 98/62   Pulse: 70   SpO2: 98%   Weight: 86.1 kg (189 lb 13.1 oz)   Height: 5' 8" (1.727 m)   PainSc: 0-No pain     Body mass index is 28.86 kg/m².    Physical Exam   Constitutional: He is oriented to person, place, " and time. He appears well-developed and well-nourished.   OVERWEIGHT   HENT:   Head: Normocephalic and atraumatic.   Mouth/Throat: Oropharynx is clear and moist.   Eyes: Conjunctivae and EOM are normal. Pupils are equal, round, and reactive to light.   Neck: Neck supple. Normal carotid pulses and no hepatojugular reflux present. Carotid bruit is present. No tracheal deviation present. No thyromegaly present.   Cardiovascular: Normal rate.  An irregular rhythm present. Exam reveals no gallop, no distant heart sounds, no friction rub and no midsystolic click.    Murmur heard.  High-pitched holosystolic murmur is present with a grade of 2/6  at the apex   Early diastolic murmur is present with a grade of 1/6  at the upper right sternal border  Pulses:       Carotid pulses are 2+ on the right side with bruit, and 2+ on the left side with bruit.       Radial pulses are 2+ on the right side, and 2+ on the left side.        Femoral pulses are 2+ on the right side, and 2+ on the left side.       Posterior tibial pulses are 2+ on the right side, and 2+ on the left side.   Pulmonary/Chest: Effort normal and breath sounds normal.   STERNOTOMY SCAR   Abdominal: Soft. Bowel sounds are normal. He exhibits no distension. There is no hepatosplenomegaly. There is no tenderness. There is no CVA tenderness.   Musculoskeletal: Normal range of motion. He exhibits no edema.   TRACE EDEMA   Lymphadenopathy:     He has no cervical adenopathy.   Neurological: He is alert and oriented to person, place, and time. He displays no tremor. No cranial nerve deficit.   Skin: Skin is warm and dry. No bruising noted. No cyanosis or erythema.   Psychiatric: He has a normal mood and affect. His speech is normal and behavior is normal. Cognition and memory are normal.               ..    Chemistry        Component Value Date/Time     05/01/2018 1020    K 4.2 05/01/2018 1020    CL 97 05/01/2018 1020    CO2 30 05/01/2018 1020    BUN 40 (H)  05/01/2018 1020    CREATININE 1.23 05/01/2018 1020     (H) 05/01/2018 1020        Component Value Date/Time    CALCIUM 9.1 05/01/2018 1020    ALKPHOS 61 01/30/2018 1345    AST 44 01/30/2018 1345    ALT 46 (H) 01/30/2018 1345    BILITOT 0.5 01/30/2018 1345    ESTGFRAFRICA >60 05/01/2018 1020    EGFRNONAA 57 (A) 05/01/2018 1020            ..  Lab Results   Component Value Date    CHOL 102 (L) 06/26/2017     Lab Results   Component Value Date    HDL 66 06/26/2017     Lab Results   Component Value Date    LDLCALC 27.6 (L) 06/26/2017     Lab Results   Component Value Date    TRIG 42 06/26/2017     Lab Results   Component Value Date    CHOLHDL 64.7 (H) 06/26/2017     ..  Lab Results   Component Value Date    WBC 4.33 05/01/2018    HGB 11.4 (L) 05/01/2018    HCT 33.8 (L) 05/01/2018    MCV 82 05/01/2018     05/01/2018       Test(s) Reviewed  I have reviewed the following in detail:  [] Stress test   [x] Angiography   [] Echocardiogram   [x] Labs   [] Other:       Assessment:         ICD-10-CM ICD-9-CM   1. Left heart failure I50.1 428.1   2. Ischemic cardiomyopathy I25.5 414.8   3. Long term (current) use of anticoagulants Z79.01 V58.61   4. Mitral and aortic regurgitation I08.0 396.3     Problem List Items Addressed This Visit        Cardiac/Vascular    Left heart failure - Primary    Relevant Orders    Case Request-Cath Lab: ICD SC new (Completed)    Mitral and aortic regurgitation    Ischemic cardiomyopathy    Relevant Orders    Case Request-Cath Lab: ICD SC new (Completed)       Hematology    Long term (current) use of anticoagulants           Plan:     WILL NEED AICD, EXPLAINED, CONSENT, DAILY WEIGHT, ALL CV CLINICALLY STABLE, NO ANGINA/ CLASS 1, CLASS 2  HF, NO TIA, NO CLINICAL ARRHYTHMIA,CONTINUE CURRENT MEDS, EDUCATION, DIET, EXERCISE,       Left heart failure  -     Case Request-Cath Lab: ICD SC new; Standing  -     Verify informed consent; Standing  -     Height and weight; Standing  -     Void on  call to Lab; Standing  -     Site prep; Standing  -     Call EP Lab (#[id number]) when patient is ready; Standing  -     Start 2 18g saline locks; one in each arm; Standing  -     Notify physician (specify); Standing  -     Diet NPO; Standing  -     CBC auto differential; Standing  -     Protime-INR; Standing  -     EKG 12-lead; Standing  -     Place in Outpatient; Standing    Ischemic cardiomyopathy  -     Case Request-Cath Lab: ICD SC new; Standing  -     Verify informed consent; Standing  -     Height and weight; Standing  -     Void on call to Lab; Standing  -     Site prep; Standing  -     Call EP Lab (#[id number]) when patient is ready; Standing  -     Start 2 18g saline locks; one in each arm; Standing  -     Notify physician (specify); Standing  -     Diet NPO; Standing  -     CBC auto differential; Standing  -     Protime-INR; Standing  -     EKG 12-lead; Standing  -     Place in Outpatient; Standing    Long term (current) use of anticoagulants    Mitral and aortic regurgitation    Other orders  -     ceFAZolin (ANCEF) 2 g in dextrose 5 % 50 mL IVPB; Inject 2 g into the vein On call Procedure (Surgery).  -     vancomycin (VANCOCIN) 1,000 mg in sodium chloride 0.9% 500 mL IVPB; Inject 1,000 mg into the vein On call Procedure (surgery).  -     vancomycin (VANCOCIN) 1,500 mg in sodium chloride 0.9% 500 mL IVPB; Inject 1,500 mg into the vein On call Procedure (surgery).  -     mupirocin 2 % ointment 1 g; 1 g by Nasal route On call Procedure (Surgery).  -     carvedilol (COREG) 12.5 MG tablet; Take 1 tablet (12.5 mg total) by mouth 2 (two) times daily.  Dispense: 180 tablet; Refill: 1  -     benazepril (LOTENSIN) 10 MG tablet; Take 1 tablet (10 mg total) by mouth every evening.  Dispense: 90 tablet; Refill: 0    RTC Low level/low impact aerobic exercise 5x's/wk. Heart healthy diet and risk factor modification.    See labs and med orders.    Aerobic exercise 5x's/wk. Heart healthy diet and risk factor modification.    See  labs and med orders.

## 2018-06-07 RX ORDER — POTASSIUM CHLORIDE 750 MG/1
TABLET, EXTENDED RELEASE ORAL
Qty: 45 TABLET | Refills: 0 | Status: SHIPPED | OUTPATIENT
Start: 2018-06-07 | End: 2018-08-26 | Stop reason: SDUPTHER

## 2018-06-26 DIAGNOSIS — I25.5 ISCHEMIC CARDIOMYOPATHY: ICD-10-CM

## 2018-06-26 DIAGNOSIS — Z95.810 CARDIAC DEFIBRILLATOR IN SITU: Primary | ICD-10-CM

## 2018-06-27 ENCOUNTER — TELEPHONE (OUTPATIENT)
Dept: CARDIOLOGY | Facility: CLINIC | Age: 73
End: 2018-06-27

## 2018-06-27 RX ORDER — DOXYCYCLINE HYCLATE 100 MG
100 TABLET ORAL 2 TIMES DAILY
Qty: 10 TABLET | Refills: 0 | Status: SHIPPED | OUTPATIENT
Start: 2018-06-27 | End: 2018-11-28 | Stop reason: ALTCHOICE

## 2018-06-27 NOTE — TELEPHONE ENCOUNTER
----- Message from Fredo Osuna sent at 6/27/2018 10:46 AM CDT -----  Contact: Patient  Type:  Sooner Apoointment Request    Caller is requesting a sooner appointment.  Caller declined first available appointment listed below.  Caller will not accept being placed on the waitlist and is requesting a message be sent to doctor.    Name of Caller:  Kaiden  When is the first available appointment?  8/30  Symptoms:  5 wk f/u  Best Call Back Number:  [phone]  Additional Information:  n/a

## 2018-06-27 NOTE — TELEPHONE ENCOUNTER
----- Message from Josise Arceo sent at 6/27/2018  3:48 PM CDT -----  Contact: Patient  Type: Needs Medical Advice    Who Called: Patient   Symptoms (please be specific):  na  How long has patient had these symptoms:  ginny  Pharmacy name and phone #:  ginny  Best Call Back Number:   Additional Information: Calling to speak with the Nurse to be sure she is getting the readings from his heart monitor. Please advise.

## 2018-06-29 ENCOUNTER — TELEPHONE (OUTPATIENT)
Dept: CARDIOLOGY | Facility: CLINIC | Age: 73
End: 2018-06-29

## 2018-07-03 ENCOUNTER — CLINICAL SUPPORT (OUTPATIENT)
Dept: CARDIOLOGY | Facility: CLINIC | Age: 73
End: 2018-07-03
Payer: MEDICARE

## 2018-07-03 DIAGNOSIS — Z95.810 CARDIAC DEFIBRILLATOR IN SITU: ICD-10-CM

## 2018-07-03 DIAGNOSIS — I25.5 ISCHEMIC CARDIOMYOPATHY: ICD-10-CM

## 2018-07-03 PROCEDURE — 93282 PRGRMG EVAL IMPLANTABLE DFB: CPT | Mod: PBBFAC,PO | Performed by: INTERNAL MEDICINE

## 2018-07-05 LAB
BATTERY VOLTAGE (V): 3.09 V
CHARGE TIME (SEC): 9.9 SEC
HV IMPEDANCE (OHM): 63 OHM
IMPEDANCE RA LEAD (NATIVE): 462 OHMS
OHS CV DC PP MS1: 0.4 MS
OHS CV DC PP V1: NORMAL V
P/R-WAVE RA LEAD (NATIVE): NORMAL MV
P/R-WAVE RA LEAD: 0.5 MV
THRESHOLD MS RA LEAD: 0.4 MS
THRESHOLD V RA LEAD: 0.4 V

## 2018-07-12 DIAGNOSIS — Z51.81 ANTICOAGULATION MANAGEMENT ENCOUNTER: ICD-10-CM

## 2018-07-12 DIAGNOSIS — Z79.01 ANTICOAGULATION MANAGEMENT ENCOUNTER: ICD-10-CM

## 2018-07-12 RX ORDER — WARFARIN 2.5 MG/1
TABLET ORAL
Qty: 50 TABLET | Refills: 1 | Status: SHIPPED | OUTPATIENT
Start: 2018-07-12 | End: 2018-08-13 | Stop reason: SDUPTHER

## 2018-08-07 ENCOUNTER — CLINICAL SUPPORT (OUTPATIENT)
Dept: CARDIOLOGY | Facility: CLINIC | Age: 73
End: 2018-08-07
Payer: MEDICARE

## 2018-08-07 DIAGNOSIS — Z95.810 CARDIAC DEFIBRILLATOR IN SITU: ICD-10-CM

## 2018-08-07 DIAGNOSIS — I25.5 ISCHEMIC CARDIOMYOPATHY: ICD-10-CM

## 2018-08-07 PROCEDURE — 93282 PRGRMG EVAL IMPLANTABLE DFB: CPT | Mod: PBBFAC,PO | Performed by: INTERNAL MEDICINE

## 2018-08-07 RX ORDER — BENAZEPRIL HYDROCHLORIDE 10 MG/1
TABLET ORAL
Qty: 90 TABLET | Status: CANCELLED | OUTPATIENT
Start: 2018-08-07

## 2018-08-08 ENCOUNTER — OFFICE VISIT (OUTPATIENT)
Dept: CARDIOLOGY | Facility: CLINIC | Age: 73
End: 2018-08-08
Payer: MEDICARE

## 2018-08-08 VITALS
WEIGHT: 194.88 LBS | DIASTOLIC BLOOD PRESSURE: 64 MMHG | HEIGHT: 68 IN | BODY MASS INDEX: 29.54 KG/M2 | HEART RATE: 70 BPM | OXYGEN SATURATION: 98 % | SYSTOLIC BLOOD PRESSURE: 90 MMHG

## 2018-08-08 DIAGNOSIS — Z79.01 LONG TERM (CURRENT) USE OF ANTICOAGULANTS: ICD-10-CM

## 2018-08-08 DIAGNOSIS — I48.20 CHRONIC ATRIAL FIBRILLATION: ICD-10-CM

## 2018-08-08 DIAGNOSIS — I50.1 LEFT HEART FAILURE: Primary | ICD-10-CM

## 2018-08-08 LAB
BATTERY VOLTAGE (V): 3.1 V
HV IMPEDANCE (OHM): 72 OHM
IMPEDANCE RA LEAD (NATIVE): 448 OHMS
OHS CV DC PP MS1: 0.4 MS
OHS CV DC PP V1: NORMAL V
P/R-WAVE RA LEAD (NATIVE): NORMAL MV
P/R-WAVE RA LEAD: NORMAL MV
THRESHOLD MS RA LEAD (NATIVE): 0.4 MS
THRESHOLD MS RA LEAD: 0.4 MS
THRESHOLD V RA LEAD (NATIVE): 0.8 V
THRESHOLD V RA LEAD: 0.8 V

## 2018-08-08 PROCEDURE — 99213 OFFICE O/P EST LOW 20 MIN: CPT | Mod: S$GLB,,, | Performed by: INTERNAL MEDICINE

## 2018-08-08 RX ORDER — BENAZEPRIL HYDROCHLORIDE 10 MG/1
10 TABLET ORAL NIGHTLY
Qty: 90 TABLET | Refills: 0 | Status: SHIPPED | OUTPATIENT
Start: 2018-08-08 | End: 2018-10-26 | Stop reason: SDUPTHER

## 2018-08-08 NOTE — PROGRESS NOTES
Subjective:    Patient ID:  Kaiden Forde is a 72 y.o. male who presents for Follow-up (CHF); Congestive Heart Failure; and Coronary Artery Disease        HPI  HAD AICD PLACED, DOING OK, NO AICD SHOCK, SEE ROS    Past Medical History:   Diagnosis Date    Acute coronary syndrome     Arthritis     Atrial fibrillation     Atrial flutter     Cancer     Cardiomyopathy     Carotid artery occlusion     CHF (congestive heart failure)     Coronary artery disease     Diabetes mellitus     Encounter for blood transfusion     GERD (gastroesophageal reflux disease)     GERD (gastroesophageal reflux disease)     Heart murmur     Hyperlipidemia     Hypertension     Hypothyroidism     Legionnaire's disease     Sleep apnea     Sleep apnea with use of continuous positive airway pressure (CPAP)     Stroke     MINI    Valvular regurgitation      Past Surgical History:   Procedure Laterality Date    CARDIAC CATHETERIZATION      CAROTID ENDARTERECTOMY      CATARACT EXTRACTION EXTRACAPSULAR W/ INTRAOCULAR LENS IMPLANTATION      COLONOSCOPY N/A 1/17/2018    Procedure: COLONOSCOPY;  Surgeon: Sachin Miguel MD;  [facility]  Service: Endoscopy;  Laterality: N/A;    CORONARY ANGIOPLASTY      CORONARY ARTERY BYPASS GRAFT      CORONARY STENT PLACEMENT      EYE SURGERY      JOINT REPLACEMENT      pauline knees    KNEE SURGERY      SHOULDER SURGERY Left      Family History   Problem Relation Age of Onset    Heart disease Brother      Social History     Social History    Marital status:      Spouse name: N/A    Number of children: N/A    Years of education: N/A     Social History Main Topics    Smoking status: Former Smoker     Quit date: 3/11/2001    Smokeless tobacco: Never Used    Alcohol use No    Drug use: No    Sexual activity: No     Other Topics Concern    Not on file     Social History Narrative    No narrative on file       Review of patient's allergies indicates:  No  Known Allergies    Current Outpatient Prescriptions:     acetaminophen (TYLENOL) 325 MG tablet, Take 2 tablets (650 mg total) by mouth every 6 (six) hours as needed., Disp: , Rfl: 0    atorvastatin (LIPITOR) 40 MG tablet, Take 1 tablet (40 mg total) by mouth every evening., Disp: 90 tablet, Rfl: 1    benazepril (LOTENSIN) 10 MG tablet, Take 1 tablet (10 mg total) by mouth every evening., Disp: 90 tablet, Rfl: 0    carvedilol (COREG) 12.5 MG tablet, Take 1 tablet (12.5 mg total) by mouth 2 (two) times daily., Disp: 180 tablet, Rfl: 1    ferrous sulfate 324 mg (65 mg iron) TbEC, Take 1 tablet (324 mg total) by mouth 2 (two) times daily., Disp: 60 tablet, Rfl: 1    furosemide (LASIX) 40 MG tablet, Take 1 tablet (40 mg total) by mouth once daily., Disp: 90 tablet, Rfl: 1    levothyroxine (SYNTHROID) 50 MCG tablet, TAKE 1 TABLET BY MOUTH ONCE DAILY, Disp: 90 tablet, Rfl: 1    metFORMIN (GLUCOPHAGE) 1000 MG tablet, Take 1 tablet (1,000 mg total) by mouth 2 (two) times daily., Disp: 180 tablet, Rfl: 3    mv-min-folic acid-lutein (CENTRUM SILVER) 400-250 mcg Chew, Take 1 tablet by mouth once daily at 6am. , Disp: , Rfl:     nitroGLYCERIN (NITROSTAT) 0.4 MG SL tablet, DISSOLVE 1 TABLET UNDER THE TONGUE EVERY 5 MINUTES FOR  CHEST PAIN UP TO 3 TABLETS  IN 15 MINUTES, Disp: 25 tablet, Rfl: 3    nitroGLYCERIN 0.2 mg/hr TD PT24 (NITRODUR) 0.2 mg/hr, Apply 1 patch to skin once  a day , remove after 12  hours, Disp: 90 patch, Rfl: 1    pantoprazole (PROTONIX) 40 MG tablet, Take 1 tablet (40 mg total) by mouth once daily., Disp: 90 tablet, Rfl: 1    potassium chloride (KLOR-CON) 10 MEQ TbSR, TAKE 1 TABLET BY MOUTH  EVERY OTHER DAY, Disp: 45 tablet, Rfl: 0    warfarin (COUMADIN) 2.5 MG tablet, TAKE 1 TABLET ON SUNDAYS AND THURSDAYS THEN 1 & 1/2 TABLET ON ALL OTHER DAYS OR AS DIRECTED BY CLINI, Disp: 50 tablet, Rfl: 1    doxycycline (VIBRA-TABS) 100 MG tablet, Take 1 tablet (100 mg total) by mouth 2 (two) times daily.,  "Disp: 10 tablet, Rfl: 0    polyethylene glycol (GLYCOLAX) 17 gram PwPk, Take 17 g by mouth once daily., Disp: 30 packet, Rfl: 6    Review of Systems   Constitution: Negative for chills, decreased appetite, diaphoresis, fever, weakness, malaise/fatigue (MILD) and night sweats. Weight gain: FEW.   HENT: Negative for congestion and nosebleeds.    Eyes: Negative for blurred vision and visual disturbance.   Cardiovascular: Negative for chest pain, claudication, cyanosis, dyspnea on exertion (MILD, ), irregular heartbeat (NOT FELT), leg swelling, near-syncope, orthopnea, palpitations, paroxysmal nocturnal dyspnea and syncope.   Respiratory: Negative for cough, hemoptysis and wheezing. Shortness of breath: SOME.    Endocrine: Negative for cold intolerance, heat intolerance, polydipsia, polyphagia and polyuria.   Hematologic/Lymphatic: Negative for adenopathy and bleeding problem. Does not bruise/bleed easily.   Skin: Negative for color change, itching and rash.   Musculoskeletal: Negative for back pain, falls and joint swelling.   Gastrointestinal: Negative for abdominal pain, change in bowel habit, dysphagia, heartburn, hematemesis, jaundice and melena.   Genitourinary: Negative for dysuria, flank pain and frequency. Nocturia: ONCE.   Neurological: Negative for brief paralysis, dizziness, focal weakness and light-headedness.   Psychiatric/Behavioral: Negative for altered mental status and depression. The patient is not nervous/anxious.    Allergic/Immunologic: Negative for hives and persistent infections.        Objective:      Vitals:    08/08/18 1352   BP: 90/64   Pulse: 70   SpO2: 98%   Weight: 88.4 kg (194 lb 14.2 oz)   Height: 5' 8" (1.727 m)   PainSc: 0-No pain     Body mass index is 29.63 kg/m².    Physical Exam   Constitutional: He is oriented to person, place, and time. He appears well-developed and well-nourished.   OVERWEIGHT   HENT:   Head: Normocephalic and atraumatic.   Mouth/Throat: Oropharynx is clear and " moist.   Eyes: Conjunctivae and EOM are normal. Pupils are equal, round, and reactive to light.   Neck: Neck supple. Normal carotid pulses and no hepatojugular reflux present. Carotid bruit is present. No tracheal deviation present. No thyromegaly present.   Cardiovascular: Normal rate.  An irregular rhythm present. Exam reveals no gallop, no distant heart sounds, no friction rub and no midsystolic click.    Murmur heard.  High-pitched holosystolic murmur is present with a grade of 2/6  at the apex   Early diastolic murmur is present with a grade of 1/6  at the upper right sternal border  Pulses:       Carotid pulses are 2+ on the right side with bruit, and 2+ on the left side with bruit.       Radial pulses are 2+ on the right side, and 2+ on the left side.        Femoral pulses are 2+ on the right side, and 2+ on the left side.       Posterior tibial pulses are 2+ on the right side, and 2+ on the left side.   Pulmonary/Chest: Effort normal and breath sounds normal.   STERNOTOMY SCAR   Abdominal: Soft. Bowel sounds are normal. He exhibits no distension. There is no hepatosplenomegaly. There is no tenderness. There is no CVA tenderness.   Musculoskeletal: Normal range of motion. He exhibits no edema.   TRACE EDEMA   Lymphadenopathy:     He has no cervical adenopathy.   Neurological: He is alert and oriented to person, place, and time. He displays no tremor. No cranial nerve deficit.   Skin: Skin is warm and dry. No bruising noted. No cyanosis or erythema.   Psychiatric: He has a normal mood and affect. His speech is normal and behavior is normal. Cognition and memory are normal.               ..    Chemistry        Component Value Date/Time     06/26/2018 0810    K 3.8 06/26/2018 0810    CL 99 06/26/2018 0810    CO2 27 06/26/2018 0810    BUN 50 (H) 06/26/2018 0810    CREATININE 1.56 (H) 06/26/2018 0810     06/26/2018 0810        Component Value Date/Time    CALCIUM 8.0 (L) 06/26/2018 0810    ALKPHOS 96  06/05/2018 1208    AST 34 06/05/2018 1208    ALT 42 06/05/2018 1208    BILITOT 1.1 06/05/2018 1208    ESTGFRAFRICA 50 (A) 06/26/2018 0810    EGFRNONAA 43 (A) 06/26/2018 0810            ..  Lab Results   Component Value Date    CHOL 124 06/05/2018    CHOL 102 (L) 06/26/2017     Lab Results   Component Value Date    HDL 55 06/05/2018    HDL 66 06/26/2017     Lab Results   Component Value Date    LDLCALC 57.8 (L) 06/05/2018    LDLCALC 27.6 (L) 06/26/2017     Lab Results   Component Value Date    TRIG 56 06/05/2018    TRIG 42 06/26/2017     Lab Results   Component Value Date    CHOLHDL 44.4 06/05/2018    CHOLHDL 64.7 (H) 06/26/2017     ..  Lab Results   Component Value Date    WBC 4.68 06/26/2018    HGB 10.4 (L) 06/26/2018    HCT 30.2 (L) 06/26/2018    MCV 86 06/26/2018     (L) 06/26/2018       Test(s) Reviewed  I have reviewed the following in detail:  [] Stress test   [] Angiography   [] Echocardiogram   [x] Labs   [x] Other:       Assessment:         ICD-10-CM ICD-9-CM   1. Left heart failure I50.1 428.1   2. Chronic atrial fibrillation I48.2 427.31   3. Long term (current) use of anticoagulants Z79.01 V58.61     Problem List Items Addressed This Visit        Cardiac/Vascular    Left heart failure - Primary    Relevant Orders    Comprehensive metabolic panel    Atrial fibrillation    Relevant Orders    Comprehensive metabolic panel       Hematology    Long term (current) use of anticoagulants           Plan:     DAILY WEIGHT, COUMADIN EDUCATION, ALL CV CLINICALLY STABLE, CLASS 01- ANGINA, CLASS 2  HF, NO TIA, NO CLINICAL ARRHYTHMIA,CONTINUE CURRENT MEDS, EDUCATION, DIET, EXERCISE, RTC IN 3 MO WITH LABS      Left heart failure  -     Comprehensive metabolic panel; Future; Expected date: 08/08/2018    Chronic atrial fibrillation  -     Comprehensive metabolic panel; Future; Expected date: 08/08/2018    Long term (current) use of anticoagulants    RTC Low level/low impact aerobic exercise 5x's/wk. Heart healthy  diet and risk factor modification.    See labs and med orders.    Aerobic exercise 5x's/wk. Heart healthy diet and risk factor modification.    See labs and med orders.

## 2018-08-27 RX ORDER — POTASSIUM CHLORIDE 750 MG/1
TABLET, EXTENDED RELEASE ORAL
Qty: 45 TABLET | Refills: 0 | Status: SHIPPED | OUTPATIENT
Start: 2018-08-27 | End: 2018-11-23 | Stop reason: SDUPTHER

## 2018-09-18 DIAGNOSIS — K21.00 REFLUX ESOPHAGITIS: ICD-10-CM

## 2018-09-18 RX ORDER — PANTOPRAZOLE SODIUM 40 MG/1
TABLET, DELAYED RELEASE ORAL
Qty: 90 TABLET | Refills: 1 | Status: SHIPPED | OUTPATIENT
Start: 2018-09-18 | End: 2019-03-10 | Stop reason: SDUPTHER

## 2018-10-05 RX ORDER — NITROGLYCERIN 40 MG/1
PATCH TRANSDERMAL
Qty: 90 PATCH | Refills: 1 | Status: SHIPPED | OUTPATIENT
Start: 2018-10-05 | End: 2019-04-11 | Stop reason: SDUPTHER

## 2018-10-05 RX ORDER — NITROGLYCERIN 0.4 MG/1
TABLET SUBLINGUAL
Qty: 25 TABLET | Refills: 3 | Status: SHIPPED | OUTPATIENT
Start: 2018-10-05

## 2018-10-26 RX ORDER — BENAZEPRIL HYDROCHLORIDE 10 MG/1
TABLET ORAL
Qty: 90 TABLET | Refills: 0 | Status: SHIPPED | OUTPATIENT
Start: 2018-10-26 | End: 2018-11-28 | Stop reason: SDUPTHER

## 2018-10-26 RX ORDER — FUROSEMIDE 40 MG/1
TABLET ORAL
Qty: 90 TABLET | Refills: 1 | Status: SHIPPED | OUTPATIENT
Start: 2018-10-26 | End: 2019-05-30 | Stop reason: SDUPTHER

## 2018-10-26 RX ORDER — ATORVASTATIN CALCIUM 40 MG/1
TABLET, FILM COATED ORAL
Qty: 90 TABLET | Refills: 1 | Status: SHIPPED | OUTPATIENT
Start: 2018-10-26 | End: 2018-11-28 | Stop reason: SDUPTHER

## 2018-10-26 RX ORDER — CARVEDILOL 12.5 MG/1
TABLET ORAL
Qty: 180 TABLET | Refills: 1 | Status: SHIPPED | OUTPATIENT
Start: 2018-10-26 | End: 2019-05-01 | Stop reason: SDUPTHER

## 2018-11-12 ENCOUNTER — CLINICAL SUPPORT (OUTPATIENT)
Dept: CARDIOLOGY | Facility: CLINIC | Age: 73
End: 2018-11-12
Attending: INTERNAL MEDICINE
Payer: MEDICARE

## 2018-11-12 DIAGNOSIS — Z95.810 CARDIAC DEFIBRILLATOR IN SITU: ICD-10-CM

## 2018-11-12 DIAGNOSIS — I25.5 ISCHEMIC CARDIOMYOPATHY: ICD-10-CM

## 2018-11-12 PROCEDURE — 93296 REM INTERROG EVL PM/IDS: CPT | Mod: PBBFAC,PO | Performed by: INTERNAL MEDICINE

## 2018-11-12 PROCEDURE — 93295 DEV INTERROG REMOTE 1/2/MLT: CPT | Mod: ,,, | Performed by: INTERNAL MEDICINE

## 2018-11-14 LAB
BATTERY VOLTAGE (V): 3.1 V
CHARGE TIME (SEC): 9.1 SEC
HV IMPEDANCE (OHM): 77 OHM
IMPEDANCE RA LEAD (NATIVE): 462 OHMS
P/R-WAVE RA LEAD (NATIVE): 7.4 MV
P/R-WAVE RA LEAD: 0.8 MV
THRESHOLD MS RA LEAD: 0.4 MS
THRESHOLD V RA LEAD: 0.5 V

## 2018-11-24 RX ORDER — POTASSIUM CHLORIDE 750 MG/1
TABLET, EXTENDED RELEASE ORAL
Qty: 45 TABLET | Refills: 0 | Status: SHIPPED | OUTPATIENT
Start: 2018-11-24 | End: 2019-03-10 | Stop reason: SDUPTHER

## 2018-11-28 ENCOUNTER — OFFICE VISIT (OUTPATIENT)
Dept: CARDIOLOGY | Facility: CLINIC | Age: 73
End: 2018-11-28
Payer: MEDICARE

## 2018-11-28 VITALS
DIASTOLIC BLOOD PRESSURE: 82 MMHG | HEIGHT: 68 IN | BODY MASS INDEX: 29.64 KG/M2 | WEIGHT: 195.56 LBS | OXYGEN SATURATION: 97 % | HEART RATE: 97 BPM | SYSTOLIC BLOOD PRESSURE: 130 MMHG

## 2018-11-28 DIAGNOSIS — I48.20 CHRONIC ATRIAL FIBRILLATION: ICD-10-CM

## 2018-11-28 DIAGNOSIS — Z79.01 LONG TERM (CURRENT) USE OF ANTICOAGULANTS: ICD-10-CM

## 2018-11-28 DIAGNOSIS — I50.1 LEFT HEART FAILURE: Primary | ICD-10-CM

## 2018-11-28 DIAGNOSIS — I10 ESSENTIAL HYPERTENSION: ICD-10-CM

## 2018-11-28 DIAGNOSIS — I25.708 CORONARY ARTERY DISEASE OF BYPASS GRAFT OF NATIVE HEART WITH STABLE ANGINA PECTORIS: ICD-10-CM

## 2018-11-28 DIAGNOSIS — E66.9 OBESITY, CLASS I, BMI 30-34.9: ICD-10-CM

## 2018-11-28 DIAGNOSIS — E78.00 HYPERCHOLESTEREMIA: ICD-10-CM

## 2018-11-28 PROCEDURE — 99214 OFFICE O/P EST MOD 30 MIN: CPT | Mod: S$GLB,,, | Performed by: INTERNAL MEDICINE

## 2018-11-28 RX ORDER — BENAZEPRIL HYDROCHLORIDE 10 MG/1
10 TABLET ORAL NIGHTLY
Qty: 90 TABLET | Refills: 1 | Status: SHIPPED | OUTPATIENT
Start: 2018-11-28 | End: 2019-02-20 | Stop reason: SDUPTHER

## 2018-11-28 RX ORDER — ATORVASTATIN CALCIUM 40 MG/1
40 TABLET, FILM COATED ORAL NIGHTLY
Qty: 90 TABLET | Refills: 1 | Status: SHIPPED | OUTPATIENT
Start: 2018-11-28 | End: 2019-02-20 | Stop reason: SDUPTHER

## 2018-11-28 NOTE — PROGRESS NOTES
Subjective:    Patient ID:  Kaiden Forde is a 73 y.o. male who presents for Congestive Heart Failure; Atrial Fibrillation; and Coronary Artery Disease        HPI  DISCUSSED LABS AND GOALS, CMP OK, THIN SKIN, NO CP, MILD DUONG,NO AICD SHOCK,  SEE ROS    Past Medical History:   Diagnosis Date    Acute coronary syndrome     Arthritis     Atrial fibrillation     Atrial flutter     Cancer     Cardiomyopathy     Carotid artery occlusion     CHF (congestive heart failure)     Coronary artery disease     Diabetes mellitus     Encounter for blood transfusion     GERD (gastroesophageal reflux disease)     GERD (gastroesophageal reflux disease)     Heart murmur     Hyperlipidemia     Hypertension     Hypothyroidism     Legionnaire's disease     Sleep apnea     Sleep apnea with use of continuous positive airway pressure (CPAP)     Stroke     MINI    Valvular regurgitation      Past Surgical History:   Procedure Laterality Date    CARDIAC CATHETERIZATION      CARDIOVERSION N/A 5/18/2017    Performed by [facility]re [facility] at [facility] IMTIA[facility]    CAROTID ENDARTERECTOMY      CATARACT EXTRACTION EXTRACAPSULAR W/ INTRAOCULAR LENS IMPLANTATION      COLONOSCOPY N/A 1/17/2018    Procedure: COLONOSCOPY;  Surgeon: Sachin Miguel MD;  Location: [facility];  Service: Endoscopy;  Laterality: N/A;    COLONOSCOPY N/A 1/17/2018    Performed by Sachin Miguel MD at [facility]    CORONARY ANGIOPLASTY      CORONARY ARTERY BYPASS GRAFT      CORONARY STENT PLACEMENT      ESOPHAGOGASTRODUODENOSCOPY (EGD) N/A 1/15/2018    Performed by [facility]in Miguel MD at [facility]    EYE SURGERY      Heart Cath With Grafts-Left N/A 5/1/2018    Performed by [facility] at [facility] CATH    ICD SC new N/A 6/26/2018    Performed by [facility]re MD at [facility] CATH    JOINT REPLACEMENT      pauline knees    KNEE SURGERY      SHOULDER SURGERY Left     TRANSESOPHAGEAL ECHOCARDIOGRAM (MARK) N/A 5/18/2017    [facility]     Family History   Problem Relation Age of Onset    Heart disease Brother      Social History     Socioeconomic History    Marital status:      Spouse name: Not on file    Number of children: Not on file    Years of education: Not on file    Highest education level: Not on file   Social Needs    Financial resource strain: Not on file    Food insecurity - worry: Not on file    Food insecurity - inability: Not on file    Transportation needs - medical: Not on file    Transportation needs - non-medical: Not on file   Occupational History    Not on file   Tobacco Use    Smoking status: Former Smoker     Last attempt to quit: 3/11/2001     Years since quittin.7    Smokeless tobacco: Never Used   Substance and Sexual Activity    Alcohol use: No    Drug use: No    Sexual activity: No   Other Topics Concern    Not on file   Social History Narrative    Not on file       Review of patient's allergies indicates:   Allergen Reactions    Oxycodone Rash       Current Outpatient Medications:     acetaminophen (TYLENOL) 325 MG tablet, Take 2 tablets (650 mg total) by mouth every 6 (six) hours as needed., Disp: , Rfl: 0    atorvastatin (LIPITOR) 40 MG tablet, TAKE 1 TABLET BY MOUTH  EVERY EVENING, Disp: 90 tablet, Rfl: 1    benazepril (LOTENSIN) 10 MG tablet, TAKE 1 TABLET BY MOUTH  EVERY EVENING, Disp: 90 tablet, Rfl: 0    carvedilol (COREG) 12.5 MG tablet, TAKE 1 TABLET BY MOUTH TWO  TIMES DAILY, Disp: 180 tablet, Rfl: 1    ferrous sulfate 324 mg (65 mg iron) TbEC, Take 1 tablet (324 mg total) by mouth 2 (two) times daily., Disp: 60 tablet, Rfl: 1    furosemide (LASIX) 40 MG tablet, TAKE 1 TABLET BY MOUTH ONCE DAILY, Disp: 90 tablet, Rfl: 1    levothyroxine (SYNTHROID) 50 MCG tablet, TAKE 1 TABLET BY MOUTH ONCE DAILY, Disp: 90 tablet, Rfl: 1    metFORMIN (GLUCOPHAGE) 1000 MG tablet, TAKE 1 TABLET BY MOUTH TWO  TIMES DAILY, Disp: 180 tablet, Rfl: 3    mv-min-folic  acid-lutein (CENTRUM SILVER) 400-250 mcg Chew, Take 1 tablet by mouth once daily at 6am. , Disp: , Rfl:     nitroGLYCERIN (NITROSTAT) 0.4 MG SL tablet, DISSOLVE 1 TABLET UNDER THE TONGUE EVERY 5 MINUTES FOR  CHEST PAIN UP TO 3 TABLETS  IN 15 MINUTES CALL 911 IF  CHEST PAIN PERSISTS, Disp: 25 tablet, Rfl: 3    nitroGLYCERIN 0.2 mg/hr TD PT24 (NITRODUR) 0.2 mg/hr, APPLY 1 PATCH TO SKIN ONCE  A DAY , REMOVE AFTER 12  HOURS, Disp: 90 patch, Rfl: 1    pantoprazole (PROTONIX) 40 MG tablet, TAKE 1 TABLET BY MOUTH ONCE DAILY, Disp: 90 tablet, Rfl: 1    potassium chloride (KLOR-CON) 10 MEQ TbSR, TAKE 1 TABLET BY MOUTH  EVERY OTHER DAY, Disp: 45 tablet, Rfl: 0    warfarin (COUMADIN) 2.5 MG tablet, Take 1-1.5 tablets (2.5-3.75 mg total) by mouth Daily., Disp: 50 tablet, Rfl: 6    Review of Systems   Constitution: Negative for chills, decreased appetite, diaphoresis, fever, weakness, malaise/fatigue (BETTER), night sweats and weight gain (FEW POUNDS).   HENT: Negative for congestion and nosebleeds.    Eyes: Negative for blurred vision and visual disturbance.   Cardiovascular: Negative for chest pain, claudication, cyanosis, dyspnea on exertion (MILD, ), irregular heartbeat (NOT FELT), leg swelling, near-syncope, orthopnea, palpitations, paroxysmal nocturnal dyspnea and syncope.   Respiratory: Negative for cough, hemoptysis, shortness of breath (SOME) and wheezing.    Endocrine: Negative for cold intolerance, heat intolerance, polydipsia, polyphagia and polyuria.   Hematologic/Lymphatic: Negative for adenopathy and bleeding problem. Does not bruise/bleed easily (SOMEWHAT).   Skin: Negative for color change and rash.   Musculoskeletal: Negative for back pain, falls and joint swelling.   Gastrointestinal: Negative for abdominal pain, change in bowel habit, dysphagia, heartburn, hematemesis, jaundice and melena.   Genitourinary: Negative for dysuria, flank pain and frequency. Nocturia: ONCE.   Neurological: Negative for brief  "paralysis, dizziness, focal weakness and light-headedness.   Psychiatric/Behavioral: Negative for altered mental status and depression. The patient is not nervous/anxious.    Allergic/Immunologic: Negative for environmental allergies and persistent infections.        Objective:      Vitals:    11/28/18 1301   BP: 130/82   Pulse: 97   SpO2: 97%   Weight: 88.7 kg (195 lb 8.8 oz)   Height: 5' 8" (1.727 m)   PainSc: 0-No pain     Body mass index is 29.73 kg/m².    Physical Exam   Constitutional: He is oriented to person, place, and time. He appears well-developed and well-nourished.   OVERWEIGHT   HENT:   Head: Normocephalic and atraumatic.   Mouth/Throat: Oropharynx is clear and moist.   Eyes: Conjunctivae and EOM are normal. Pupils are equal, round, and reactive to light.   Neck: Neck supple. Normal carotid pulses and no hepatojugular reflux present. Carotid bruit is present. No tracheal deviation present. No thyromegaly present.   Cardiovascular: Normal rate. An irregular rhythm present. Exam reveals no gallop, no distant heart sounds, no friction rub and no midsystolic click.   Murmur heard.  High-pitched holosystolic murmur is present with a grade of 2/6 at the apex.   Early diastolic murmur is present with a grade of 1/6 at the upper right sternal border.  Pulses:       Carotid pulses are 2+ on the right side with bruit, and 2+ on the left side with bruit.       Radial pulses are 2+ on the right side, and 2+ on the left side.        Femoral pulses are 2+ on the right side, and 2+ on the left side.       Posterior tibial pulses are 2+ on the right side, and 2+ on the left side.   Pulmonary/Chest: Effort normal and breath sounds normal. No respiratory distress. He has no wheezes. He has no rales.   STERNOTOMY SCAR   Abdominal: Soft. Bowel sounds are normal. He exhibits no distension. There is no hepatosplenomegaly. There is no tenderness. There is no CVA tenderness.   Musculoskeletal: Normal range of motion. He " exhibits no edema.   TRACE EDEMA   Lymphadenopathy:     He has no cervical adenopathy.   Neurological: He is alert and oriented to person, place, and time. He displays no tremor. No cranial nerve deficit.   Skin: Skin is warm and dry. No bruising noted. No cyanosis or erythema.   Psychiatric: He has a normal mood and affect. His speech is normal and behavior is normal. Cognition and memory are normal.               ..    Chemistry        Component Value Date/Time     06/26/2018 0810    K 3.8 06/26/2018 0810    CL 99 06/26/2018 0810    CO2 27 06/26/2018 0810    BUN 50 (H) 06/26/2018 0810    CREATININE 1.56 (H) 06/26/2018 0810     06/26/2018 0810        Component Value Date/Time    CALCIUM 8.0 (L) 06/26/2018 0810    ALKPHOS 96 06/05/2018 1208    AST 34 06/05/2018 1208    ALT 42 06/05/2018 1208    BILITOT 1.1 06/05/2018 1208    ESTGFRAFRICA 50 (A) 06/26/2018 0810    EGFRNONAA 43 (A) 06/26/2018 0810            ..  Lab Results   Component Value Date    CHOL 124 06/05/2018    CHOL 102 (L) 06/26/2017     Lab Results   Component Value Date    HDL 55 06/05/2018    HDL 66 06/26/2017     Lab Results   Component Value Date    LDLCALC 57.8 (L) 06/05/2018    LDLCALC 27.6 (L) 06/26/2017     Lab Results   Component Value Date    TRIG 56 06/05/2018    TRIG 42 06/26/2017     Lab Results   Component Value Date    CHOLHDL 44.4 06/05/2018    CHOLHDL 64.7 (H) 06/26/2017     ..  Lab Results   Component Value Date    WBC 4.68 06/26/2018    HGB 10.4 (L) 06/26/2018    HCT 30.2 (L) 06/26/2018    MCV 86 06/26/2018     (L) 06/26/2018       Test(s) Reviewed  I have reviewed the following in detail:  [] Stress test   [] Angiography   [] Echocardiogram   [x] Labs   [] Other:       Assessment:         ICD-10-CM ICD-9-CM   1. Left heart failure I50.1 428.1   2. Chronic atrial fibrillation I48.2 427.31   3. Coronary artery disease of bypass graft of native heart with stable angina pectoris I25.708 414.05     413.9   4. Essential  hypertension I10 401.9   5. Hypercholesteremia E78.00 272.0   6. Long term (current) use of anticoagulants Z79.01 V58.61   7. Obesity, Class I, BMI 30-34.9 E66.9 278.00     Problem List Items Addressed This Visit        Cardiac/Vascular    Hypertension    Coronary artery disease of bypass graft of native heart with stable angina pectoris    Left heart failure - Primary    Hypercholesteremia    A-fib       Hematology    Long term (current) use of anticoagulants       Endocrine    Obesity, Class I, BMI 30-34.9           Plan:         DAILY WEIGHT, EXPLAINED, ALL CV CLINICALLY STABLE, CLASS 1 ANGINA, CLASS 2  HF, NO TIA, STABLE CLINICAL ARRHYTHMIA,CONTINUE CURRENT MEDS, EDUCATION, DIET, EXERCISE, WEIGHT LOSS, RTC IN 4-5 MO WITH LABS  Left heart failure    Chronic atrial fibrillation    Coronary artery disease of bypass graft of native heart with stable angina pectoris    Essential hypertension    Hypercholesteremia    Long term (current) use of anticoagulants    Obesity, Class I, BMI 30-34.9    RTC Low level/low impact aerobic exercise 5x's/wk. Heart healthy diet and risk factor modification.    See labs and med orders.    Aerobic exercise 5x's/wk. Heart healthy diet and risk factor modification.    See labs and med orders.

## 2018-12-05 ENCOUNTER — CLINICAL SUPPORT (OUTPATIENT)
Dept: CARDIOLOGY | Facility: CLINIC | Age: 73
End: 2018-12-05
Payer: MEDICARE

## 2018-12-05 DIAGNOSIS — Z95.810 CARDIAC DEFIBRILLATOR IN SITU: Primary | ICD-10-CM

## 2018-12-05 DIAGNOSIS — I48.20 CHRONIC ATRIAL FIBRILLATION: ICD-10-CM

## 2018-12-05 NOTE — PROGRESS NOTES
Patient has chronic AF.  Repeated alerts received for atrial arrhythmia.   Patient into clinic and AT/AF & SVT alerts turned off in his ICD.

## 2019-01-14 PROBLEM — I48.91 ATRIAL FIBRILLATION, UNSPECIFIED TYPE: Status: ACTIVE | Noted: 2019-01-14

## 2019-02-18 ENCOUNTER — CLINICAL SUPPORT (OUTPATIENT)
Dept: CARDIOLOGY | Facility: CLINIC | Age: 74
End: 2019-02-18
Attending: INTERNAL MEDICINE
Payer: MEDICARE

## 2019-02-18 DIAGNOSIS — I25.5 ISCHEMIC CARDIOMYOPATHY: ICD-10-CM

## 2019-02-18 DIAGNOSIS — Z95.810 CARDIAC DEFIBRILLATOR IN SITU: ICD-10-CM

## 2019-02-18 PROCEDURE — 93295 DEV INTERROG REMOTE 1/2/MLT: CPT | Mod: ,,, | Performed by: INTERNAL MEDICINE

## 2019-02-18 PROCEDURE — 93295 CARDIAC DEVICE CHECK CHECK - REMOTE: ICD-10-PCS | Mod: ,,, | Performed by: INTERNAL MEDICINE

## 2019-02-18 PROCEDURE — 93296 REM INTERROG EVL PM/IDS: CPT | Mod: PBBFAC,PO | Performed by: INTERNAL MEDICINE

## 2019-02-19 LAB
BATTERY VOLTAGE (V): 3.11 V
CHARGE TIME (SEC): 9.4 SEC
HV IMPEDANCE (OHM): 78 OHM
IMPEDANCE RA LEAD (NATIVE): 419 OHMS
P/R-WAVE RA LEAD (NATIVE): NORMAL MV
P/R-WAVE RA LEAD: 0.5 MV
THRESHOLD MS RA LEAD: 0.4 MS
THRESHOLD V RA LEAD: 0.6 V

## 2019-02-20 RX ORDER — BENAZEPRIL HYDROCHLORIDE 10 MG/1
TABLET ORAL
Qty: 90 TABLET | Refills: 1 | Status: SHIPPED | OUTPATIENT
Start: 2019-02-20 | End: 2019-09-30 | Stop reason: SDUPTHER

## 2019-02-20 RX ORDER — ATORVASTATIN CALCIUM 40 MG/1
TABLET, FILM COATED ORAL
Qty: 90 TABLET | Refills: 1 | Status: SHIPPED | OUTPATIENT
Start: 2019-02-20 | End: 2019-10-09 | Stop reason: SDUPTHER

## 2019-03-10 DIAGNOSIS — K21.00 REFLUX ESOPHAGITIS: ICD-10-CM

## 2019-03-10 RX ORDER — PANTOPRAZOLE SODIUM 40 MG/1
TABLET, DELAYED RELEASE ORAL
Qty: 90 TABLET | Refills: 1 | Status: SHIPPED | OUTPATIENT
Start: 2019-03-10 | End: 2019-06-24 | Stop reason: SDUPTHER

## 2019-03-10 RX ORDER — POTASSIUM CHLORIDE 750 MG/1
TABLET, EXTENDED RELEASE ORAL
Qty: 45 TABLET | Refills: 0 | Status: SHIPPED | OUTPATIENT
Start: 2019-03-10 | End: 2019-05-30 | Stop reason: SDUPTHER

## 2019-03-26 ENCOUNTER — CLINICAL SUPPORT (OUTPATIENT)
Dept: CARDIOLOGY | Facility: CLINIC | Age: 74
End: 2019-03-26
Attending: INTERNAL MEDICINE
Payer: MEDICARE

## 2019-03-26 DIAGNOSIS — I25.5 ISCHEMIC CARDIOMYOPATHY: ICD-10-CM

## 2019-03-26 DIAGNOSIS — Z95.810 CARDIAC DEFIBRILLATOR IN SITU: ICD-10-CM

## 2019-04-11 RX ORDER — NITROGLYCERIN 40 MG/1
PATCH TRANSDERMAL
Qty: 90 PATCH | Refills: 1 | Status: SHIPPED | OUTPATIENT
Start: 2019-04-11 | End: 2019-06-24 | Stop reason: SDUPTHER

## 2019-04-12 DIAGNOSIS — Z51.81 ANTICOAGULATION MANAGEMENT ENCOUNTER: ICD-10-CM

## 2019-04-12 DIAGNOSIS — Z79.01 ANTICOAGULATION MANAGEMENT ENCOUNTER: ICD-10-CM

## 2019-04-12 RX ORDER — WARFARIN 2.5 MG/1
2.5-3.75 TABLET ORAL DAILY
Qty: 90 TABLET | Refills: 1 | Status: SHIPPED | OUTPATIENT
Start: 2019-04-12 | End: 2019-11-04 | Stop reason: SDUPTHER

## 2019-04-12 RX ORDER — WARFARIN 2.5 MG/1
2.5-3.75 TABLET ORAL DAILY
Qty: 90 TABLET | Refills: 1 | Status: SHIPPED | OUTPATIENT
Start: 2019-04-12 | End: 2019-04-12 | Stop reason: SDUPTHER

## 2019-04-24 ENCOUNTER — CLINICAL SUPPORT (OUTPATIENT)
Dept: CARDIOLOGY | Facility: CLINIC | Age: 74
End: 2019-04-24
Attending: INTERNAL MEDICINE
Payer: MEDICARE

## 2019-04-24 DIAGNOSIS — Z95.810 CARDIAC DEFIBRILLATOR IN SITU: ICD-10-CM

## 2019-04-24 DIAGNOSIS — I25.5 ISCHEMIC CARDIOMYOPATHY: ICD-10-CM

## 2019-05-01 ENCOUNTER — OFFICE VISIT (OUTPATIENT)
Dept: CARDIOLOGY | Facility: CLINIC | Age: 74
End: 2019-05-01
Payer: MEDICARE

## 2019-05-01 VITALS
OXYGEN SATURATION: 97 % | WEIGHT: 197.06 LBS | DIASTOLIC BLOOD PRESSURE: 66 MMHG | HEIGHT: 68 IN | SYSTOLIC BLOOD PRESSURE: 102 MMHG | BODY MASS INDEX: 29.86 KG/M2 | HEART RATE: 60 BPM

## 2019-05-01 DIAGNOSIS — I25.5 ISCHEMIC CARDIOMYOPATHY: ICD-10-CM

## 2019-05-01 DIAGNOSIS — Z79.01 LONG TERM (CURRENT) USE OF ANTICOAGULANTS: ICD-10-CM

## 2019-05-01 DIAGNOSIS — I08.0 MITRAL AND AORTIC REGURGITATION: ICD-10-CM

## 2019-05-01 DIAGNOSIS — G47.33 OBSTRUCTIVE SLEEP APNEA SYNDROME: ICD-10-CM

## 2019-05-01 DIAGNOSIS — E78.00 HYPERCHOLESTEREMIA: ICD-10-CM

## 2019-05-01 DIAGNOSIS — I48.20 CHRONIC ATRIAL FIBRILLATION: ICD-10-CM

## 2019-05-01 DIAGNOSIS — I50.1 LEFT HEART FAILURE: Primary | ICD-10-CM

## 2019-05-01 DIAGNOSIS — Z95.810 CARDIAC DEFIBRILLATOR IN SITU: Primary | ICD-10-CM

## 2019-05-01 PROBLEM — E66.9 OBESITY, CLASS I, BMI 30-34.9: Status: RESOLVED | Noted: 2017-12-06 | Resolved: 2019-05-01

## 2019-05-01 PROCEDURE — 99214 OFFICE O/P EST MOD 30 MIN: CPT | Mod: S$GLB,,, | Performed by: INTERNAL MEDICINE

## 2019-05-01 PROCEDURE — 99214 PR OFFICE/OUTPT VISIT, EST, LEVL IV, 30-39 MIN: ICD-10-PCS | Mod: S$GLB,,, | Performed by: INTERNAL MEDICINE

## 2019-05-01 RX ORDER — CARVEDILOL 12.5 MG/1
12.5 TABLET ORAL 2 TIMES DAILY
Qty: 180 TABLET | Refills: 1 | Status: SHIPPED | OUTPATIENT
Start: 2019-05-01 | End: 2019-08-09 | Stop reason: SDUPTHER

## 2019-05-01 NOTE — PROGRESS NOTES
Subjective:    Patient ID:  Kaiden Forde is a 73 y.o. male who presents for Congestive Heart Failure; Atrial Fibrillation; and Coronary Artery Disease        HPI  NO LABS BUT INR, DOING OK, SOME FATIGUE, MILD/MOD DUONG, NO CP, USES CPAP AND BENEFITS FROM IT,NO AICD SHOCK,.  SEE ROS    Past Medical History:   Diagnosis Date    Acute coronary syndrome     Arthritis     Atrial fibrillation     Atrial flutter     Cancer     Cardiomyopathy     Carotid artery occlusion     CHF (congestive heart failure)     Coronary artery disease     Diabetes mellitus     Encounter for blood transfusion     GERD (gastroesophageal reflux disease)     GERD (gastroesophageal reflux disease)     Heart murmur     Hyperlipidemia     Hypertension     Hypothyroidism     Legionnaire's disease     Sleep apnea     Sleep apnea with use of continuous positive airway pressure (CPAP)     Stroke     MINI    Valvular regurgitation      Past Surgical History:   Procedure Laterality Date    CARDIAC CATHETERIZATION      CARDIOVERSION N/A 5/18/2017    [facility]    CAROTID ENDARTERECTOMY      CATARACT EXTRACTION EXTRACAPSULAR W/ INTRAOCULAR LENS IMPLANTATION      COLONOSCOPY N/A 1/17/2018    [facility] at [facility] ENDO    CORONARY ANGIOPLASTY      CORONARY ARTERY BYPASS GRAFT      CORONARY STENT PLACEMENT      ESOPHAGOGASTRODUODENOSCOPY (EGD) N/A 1/15/2018    Performed by [facility] at [facility] ENDO    EYE SURGERY      Heart Cath With Grafts-Left N/A 5/1/2018    Performed by [facility] at [facility] CATH    ICD SC new N/A 6/26/2018    Performed by [facility] at [facility] CATH    JOINT REPLACEMENT      pauline knees    KNEE SURGERY      SHOULDER SURGERY Left     TRANSESOPHAGEAL ECHOCARDIOGRAM (MARK) N/A 5/18/2017    [facility]     Family History   Problem Relation Age of Onset    Heart disease Brother      Social History      Socioeconomic History    Marital status:      Spouse name: Not on file    Number of children: Not on file    Years of education: Not on file    Highest education level: Not on file   Occupational History    Not on file   Social Needs    Financial resource strain: Not on file    Food insecurity:     Worry: Not on file     Inability: Not on file    Transportation needs:     Medical: Not on file     Non-medical: Not on file   Tobacco Use    Smoking status: Former Smoker     Last attempt to quit: 3/11/2001     Years since quittin.1    Smokeless tobacco: Never Used   Substance and Sexual Activity    Alcohol use: No    Drug use: No    Sexual activity: Never   Lifestyle    Physical activity:     Days per week: Not on file     Minutes per session: Not on file    Stress: Not on file   Relationships    Social connections:     Talks on phone: Not on file     Gets together: Not on file     Attends Anabaptism service: Not on file     Active member of club or organization: Not on file     Attends meetings of clubs or organizations: Not on file     Relationship status: Not on file   Other Topics Concern    Not on file   Social History Narrative    Not on file       Review of patient's allergies indicates:   Allergen Reactions    Oxycodone Rash       Current Outpatient Medications:     atorvastatin (LIPITOR) 40 MG tablet, TAKE 1 TABLET BY MOUTH  EVERY EVENING, Disp: 90 tablet, Rfl: 1    benazepril (LOTENSIN) 10 MG tablet, TAKE 1 TABLET BY MOUTH  EVERY EVENING, Disp: 90 tablet, Rfl: 1    carvedilol (COREG) 12.5 MG tablet, Take 1 tablet (12.5 mg total) by mouth 2 (two) times daily., Disp: 180 tablet, Rfl: 1    ferrous sulfate 324 mg (65 mg iron) TbEC, Take 1 tablet (324 mg total) by mouth 2 (two) times daily., Disp: 60 tablet, Rfl: 1    FLUZONE HIGH-DOSE , PF, 180 mcg/0.5 mL vaccine, TO BE ADMINISTERED BY PHARMACIST FOR IMMUNIZATION, Disp: , Rfl: 0    furosemide (LASIX) 40 MG tablet,  TAKE 1 TABLET BY MOUTH ONCE DAILY, Disp: 90 tablet, Rfl: 1    levothyroxine (SYNTHROID) 50 MCG tablet, TAKE 1 TABLET BY MOUTH ONCE DAILY, Disp: 90 tablet, Rfl: 1    metFORMIN (GLUCOPHAGE) 1000 MG tablet, TAKE 1 TABLET BY MOUTH TWO  TIMES DAILY, Disp: 180 tablet, Rfl: 3    mv-min-folic acid-lutein (CENTRUM SILVER) 400-250 mcg Chew, Take 1 tablet by mouth once daily at 6am. , Disp: , Rfl:     nitroGLYCERIN (NITROSTAT) 0.4 MG SL tablet, DISSOLVE 1 TABLET UNDER THE TONGUE EVERY 5 MINUTES FOR  CHEST PAIN UP TO 3 TABLETS  IN 15 MINUTES CALL 911 IF  CHEST PAIN PERSISTS, Disp: 25 tablet, Rfl: 3    nitroGLYCERIN 0.2 mg/hr TD PT24 (NITRODUR) 0.2 mg/hr, APPLY 1 PATCH TO SKIN ONCE  A DAY , REMOVE AFTER 12  HOURS, Disp: 90 patch, Rfl: 1    pantoprazole (PROTONIX) 40 MG tablet, TAKE 1 TABLET BY MOUTH ONCE DAILY, Disp: 90 tablet, Rfl: 1    PNEUMOVAX 23 25 mcg/0.5 mL, TO BE ADMINISTERED BY PHARMACIST FOR IMMUNIZATION, Disp: , Rfl: 0    potassium chloride (KLOR-CON) 10 MEQ TbSR, TAKE 1 TABLET BY MOUTH  EVERY OTHER DAY, Disp: 45 tablet, Rfl: 0    warfarin (COUMADIN) 2.5 MG tablet, Take 1-1.5 tablets (2.5-3.75 mg total) by mouth Daily. This medication is managed by [facility]. Please contact [phone]., Disp: 90 tablet, Rfl: 1    acetaminophen (TYLENOL) 325 MG tablet, Take 2 tablets (650 mg total) by mouth every 6 (six) hours as needed., Disp: , Rfl: 0    Review of Systems   Constitution: Negative for chills, decreased appetite, diaphoresis, fever, malaise/fatigue (BETTER), night sweats and weight gain.   HENT: Negative for congestion and nosebleeds.    Eyes: Negative for blurred vision and visual disturbance.   Cardiovascular: Negative for chest pain, claudication, cyanosis, dyspnea on exertion (MILD, ), irregular heartbeat (NOT FELT), leg swelling, near-syncope, orthopnea, palpitations, paroxysmal nocturnal dyspnea and syncope.   Respiratory: Negative for cough, hemoptysis, shortness of breath (SOME) and  "wheezing.    Endocrine: Negative for cold intolerance, heat intolerance, polydipsia, polyphagia and polyuria.   Hematologic/Lymphatic: Negative for adenopathy and bleeding problem.   Skin: Negative for color change and rash.   Musculoskeletal: Negative for back pain, falls and joint swelling.   Gastrointestinal: Negative for abdominal pain, change in bowel habit, dysphagia, heartburn, hematemesis, hematochezia, jaundice and melena.   Genitourinary: Negative for dysuria, flank pain and frequency. Nocturia: ONCE.   Neurological: Negative for brief paralysis, dizziness, focal weakness, light-headedness and weakness.   Psychiatric/Behavioral: Negative for altered mental status and depression. The patient is not nervous/anxious.    Allergic/Immunologic: Negative for environmental allergies and persistent infections.        Objective:      Vitals:    05/01/19 1311   BP: 102/66   Pulse: 60   SpO2: 97%   Weight: 89.4 kg (197 lb 1.5 oz)   Height: 5' 8" (1.727 m)   PainSc: 0-No pain     Body mass index is 29.97 kg/m².    Physical Exam   Constitutional: He is oriented to person, place, and time. He appears well-developed and well-nourished.   OVERWEIGHT   HENT:   Head: Normocephalic and atraumatic.   Mouth/Throat: Oropharynx is clear and moist.   Eyes: Pupils are equal, round, and reactive to light. Conjunctivae and EOM are normal.   Neck: Neck supple. Normal carotid pulses, no hepatojugular reflux and no JVD present. Carotid bruit is present. No thyromegaly present.   Cardiovascular: Normal rate. An irregular rhythm present. Exam reveals no gallop, no distant heart sounds, no friction rub and no midsystolic click.   Murmur heard.  High-pitched holosystolic murmur is present with a grade of 2/6 at the apex.   Early diastolic murmur is present with a grade of 1/6 at the upper right sternal border.  Pulses:       Carotid pulses are 2+ on the right side with bruit, and 2+ on the left side with bruit.       Radial pulses are 2+ on " the right side, and 2+ on the left side.        Femoral pulses are 2+ on the right side, and 2+ on the left side.       Posterior tibial pulses are 2+ on the right side, and 2+ on the left side.   Pulmonary/Chest: Effort normal and breath sounds normal. No respiratory distress. He has no wheezes. He has no rales.   STERNOTOMY SCAR   Abdominal: Soft. Bowel sounds are normal. He exhibits no distension and no mass. There is no hepatosplenomegaly. There is no CVA tenderness.   Musculoskeletal: Normal range of motion. He exhibits no edema.   TRACE EDEMA   Lymphadenopathy:     He has no cervical adenopathy.   Neurological: He is alert and oriented to person, place, and time. He displays no tremor. No cranial nerve deficit.   Skin: Skin is warm and dry. No bruising noted. No cyanosis or erythema.   Psychiatric: He has a normal mood and affect. His speech is normal and behavior is normal. Cognition and memory are normal.               ..    Chemistry        Component Value Date/Time     (L) 12/05/2018 1121    K 4.7 12/05/2018 1121    CL 98 12/05/2018 1121    CO2 28 12/05/2018 1121    BUN 40 (H) 12/05/2018 1121    CREATININE 1.35 12/05/2018 1121    GLU 97 12/05/2018 1121        Component Value Date/Time    CALCIUM 8.8 12/05/2018 1121    ALKPHOS 85 12/05/2018 1121    AST 31 12/05/2018 1121    ALT 34 12/05/2018 1121    BILITOT 0.9 12/05/2018 1121    ESTGFRAFRICA 59 (A) 12/05/2018 1121    EGFRNONAA 51 (A) 12/05/2018 1121            ..  Lab Results   Component Value Date    CHOL 124 06/05/2018    CHOL 102 (L) 06/26/2017     Lab Results   Component Value Date    HDL 55 06/05/2018    HDL 66 06/26/2017     Lab Results   Component Value Date    LDLCALC 57.8 (L) 06/05/2018    LDLCALC 27.6 (L) 06/26/2017     Lab Results   Component Value Date    TRIG 56 06/05/2018    TRIG 42 06/26/2017     Lab Results   Component Value Date    CHOLHDL 44.4 06/05/2018    CHOLHDL 64.7 (H) 06/26/2017     ..  Lab Results   Component Value Date     WBC 4.68 06/26/2018    HGB 10.4 (L) 06/26/2018    HCT 30.2 (L) 06/26/2018    MCV 86 06/26/2018     (L) 06/26/2018       Test(s) Reviewed  I have reviewed the following in detail:  [] Stress test   [] Angiography   [] Echocardiogram   [x] Labs   [] Other:       Assessment:         ICD-10-CM ICD-9-CM   1. Left heart failure I50.1 428.1   2. Chronic atrial fibrillation I48.2 427.31   3. Long term (current) use of anticoagulants Z79.01 V58.61   4. Mitral and aortic regurgitation I08.0 396.3   5. Obstructive sleep apnea syndrome G47.33 327.23   6. Hypercholesteremia E78.00 272.0     Problem List Items Addressed This Visit        Cardiac/Vascular    Left heart failure - Primary    Relevant Orders    Comprehensive metabolic panel    Hypercholesteremia    Relevant Orders    Comprehensive metabolic panel    Lipid panel    Mitral and aortic regurgitation    Relevant Orders    Comprehensive metabolic panel    Atrial fibrillation    Relevant Orders    Comprehensive metabolic panel       Hematology    Long term (current) use of anticoagulants    Relevant Orders    Comprehensive metabolic panel       Other    Sleep apnea    Relevant Orders    Comprehensive metabolic panel           Plan:     DAILY WEIGHT, 2 LBS/D, 5/W RULE, EXPLAINED. LABS SOON, ALL CV CLINICALLY STABLE, NO ANGINA, CLASS 2 HF, NO TIA, NO CLINICAL ARRHYTHMIA,CONTINUE CURRENT MEDS, EDUCATION, DIET, EXERCISE, RTC IN 6 MO      Left heart failure  -     Comprehensive metabolic panel; Future; Expected date: 05/01/2019    Chronic atrial fibrillation  -     Comprehensive metabolic panel; Future; Expected date: 05/01/2019    Long term (current) use of anticoagulants  -     Comprehensive metabolic panel; Future; Expected date: 05/01/2019    Mitral and aortic regurgitation  -     Comprehensive metabolic panel; Future; Expected date: 05/01/2019    Obstructive sleep apnea syndrome  -     Comprehensive metabolic panel; Future; Expected date:  05/01/2019    Hypercholesteremia  -     Comprehensive metabolic panel; Future; Expected date: 05/01/2019  -     Lipid panel; Future; Expected date: 05/01/2019    Other orders  -     carvedilol (COREG) 12.5 MG tablet; Take 1 tablet (12.5 mg total) by mouth 2 (two) times daily.  Dispense: 180 tablet; Refill: 1    RTC Low level/low impact aerobic exercise 5x's/wk. Heart healthy diet and risk factor modification.    See labs and med orders.    Aerobic exercise 5x's/wk. Heart healthy diet and risk factor modification.    See labs and med orders.

## 2019-05-27 ENCOUNTER — CLINICAL SUPPORT (OUTPATIENT)
Dept: CARDIOLOGY | Facility: CLINIC | Age: 74
End: 2019-05-27
Attending: INTERNAL MEDICINE
Payer: MEDICARE

## 2019-05-27 DIAGNOSIS — Z95.810 CARDIAC DEFIBRILLATOR IN SITU: ICD-10-CM

## 2019-05-27 DIAGNOSIS — I25.5 ISCHEMIC CARDIOMYOPATHY: ICD-10-CM

## 2019-05-30 RX ORDER — FUROSEMIDE 40 MG/1
TABLET ORAL
Qty: 90 TABLET | Refills: 1 | Status: SHIPPED | OUTPATIENT
Start: 2019-05-30 | End: 2019-10-09 | Stop reason: SDUPTHER

## 2019-05-30 RX ORDER — POTASSIUM CHLORIDE 750 MG/1
TABLET, EXTENDED RELEASE ORAL
Qty: 45 TABLET | Refills: 0 | Status: SHIPPED | OUTPATIENT
Start: 2019-05-30 | End: 2019-06-24 | Stop reason: SDUPTHER

## 2019-06-24 DIAGNOSIS — K21.00 REFLUX ESOPHAGITIS: ICD-10-CM

## 2019-06-24 RX ORDER — NITROGLYCERIN 40 MG/1
PATCH TRANSDERMAL
Qty: 90 PATCH | Refills: 1 | Status: SHIPPED | OUTPATIENT
Start: 2019-06-24 | End: 2019-10-16 | Stop reason: SDUPTHER

## 2019-06-24 RX ORDER — PANTOPRAZOLE SODIUM 40 MG/1
TABLET, DELAYED RELEASE ORAL
Qty: 90 TABLET | Refills: 1 | Status: SHIPPED | OUTPATIENT
Start: 2019-06-24 | End: 2019-10-16 | Stop reason: SDUPTHER

## 2019-06-27 ENCOUNTER — CLINICAL SUPPORT (OUTPATIENT)
Dept: CARDIOLOGY | Facility: CLINIC | Age: 74
End: 2019-06-27
Attending: INTERNAL MEDICINE
Payer: MEDICARE

## 2019-06-27 DIAGNOSIS — I25.5 ISCHEMIC CARDIOMYOPATHY: ICD-10-CM

## 2019-06-27 DIAGNOSIS — Z95.810 CARDIAC DEFIBRILLATOR IN SITU: ICD-10-CM

## 2019-07-29 ENCOUNTER — CLINICAL SUPPORT (OUTPATIENT)
Dept: CARDIOLOGY | Facility: CLINIC | Age: 74
End: 2019-07-29
Attending: INTERNAL MEDICINE
Payer: MEDICARE

## 2019-07-29 DIAGNOSIS — I25.5 ISCHEMIC CARDIOMYOPATHY: ICD-10-CM

## 2019-07-29 DIAGNOSIS — Z95.810 CARDIAC DEFIBRILLATOR IN SITU: ICD-10-CM

## 2019-08-07 ENCOUNTER — CLINICAL SUPPORT (OUTPATIENT)
Dept: CARDIOLOGY | Facility: CLINIC | Age: 74
End: 2019-08-07
Attending: INTERNAL MEDICINE
Payer: MEDICARE

## 2019-08-07 DIAGNOSIS — I25.5 ISCHEMIC CARDIOMYOPATHY: ICD-10-CM

## 2019-08-07 DIAGNOSIS — Z95.810 CARDIAC DEFIBRILLATOR IN SITU: ICD-10-CM

## 2019-08-09 DIAGNOSIS — I50.1 LEFT HEART FAILURE: Primary | ICD-10-CM

## 2019-08-09 RX ORDER — CARVEDILOL 12.5 MG/1
12.5 TABLET ORAL 2 TIMES DAILY
Qty: 180 TABLET | Refills: 1 | Status: SHIPPED | OUTPATIENT
Start: 2019-08-09 | End: 2019-12-19

## 2019-09-11 ENCOUNTER — CLINICAL SUPPORT (OUTPATIENT)
Dept: CARDIOLOGY | Facility: CLINIC | Age: 74
End: 2019-09-11
Payer: MEDICARE

## 2019-09-11 PROCEDURE — 93297 REM INTERROG DEV EVAL ICPMS: CPT | Mod: ,,, | Performed by: INTERNAL MEDICINE

## 2019-09-11 PROCEDURE — 93297 CARDIAC DEVICE CHECK - REMOTE: ICD-10-PCS | Mod: ,,, | Performed by: INTERNAL MEDICINE

## 2019-09-30 RX ORDER — BENAZEPRIL HYDROCHLORIDE 10 MG/1
TABLET ORAL
Qty: 90 TABLET | Refills: 1 | Status: SHIPPED | OUTPATIENT
Start: 2019-09-30

## 2019-10-03 ENCOUNTER — TELEPHONE (OUTPATIENT)
Dept: CARDIOLOGY | Facility: CLINIC | Age: 74
End: 2019-10-03

## 2019-10-03 NOTE — TELEPHONE ENCOUNTER
----- Message from Gladys Davis sent at 10/3/2019  2:34 PM CDT -----  Contact: patient  Type: Needs Medical Advice    Who Called:  patient  Symptoms (please be specific):  Diarrhea  How long has patient had these symptoms:    Best Call Back Number: [phone]  Additional Information: patient stated since he started medication he has had diarrhea constantly, it has gotten much much worse recently and he thinks it is this medication and requesting a new one called in, please call to advise, thanks!

## 2019-10-03 NOTE — TELEPHONE ENCOUNTER
Spoke with patient he was started on protonix in June and has been having diarrhea since  Then please advise

## 2019-10-04 ENCOUNTER — TELEPHONE (OUTPATIENT)
Dept: CARDIOLOGY | Facility: CLINIC | Age: 74
End: 2019-10-04

## 2019-10-04 NOTE — TELEPHONE ENCOUNTER
[facility] Re: Pantoprazole and his problem with the Diarrhea.

## 2019-10-04 NOTE — TELEPHONE ENCOUNTER
----- Message from Ginna Arriaga sent at 10/4/2019  3:42 PM CDT -----  Contact: Patient  Type: Needs Medical Advice    Who Called:  Patient  Symptoms (please be specific):  Diarrhea  Best Call Back Number:  or   Additional Information: Patient was calling to speak to the nurse about his medication pantoprazole (PROTONIX) 40 MG tablet that has been giving him diarrhea. Was informed of note that said to stop taking medication and check with PCP or GI.

## 2019-10-07 ENCOUNTER — TELEPHONE (OUTPATIENT)
Dept: CARDIOLOGY | Facility: CLINIC | Age: 74
End: 2019-10-07

## 2019-10-07 NOTE — TELEPHONE ENCOUNTER
----- Message from Eva  sent at 10/7/2019 10:25 AM CDT -----  Contact: pt  Type:  Patient Returning Call    Who Called:  pt  Who Left Message for Patient:  Heather  Does the patient know what this is regarding?:  yes  Best Call Back Number:   or   Additional Information:  Patient is returning call for Nurse, please call back from Advice

## 2019-10-09 RX ORDER — ATORVASTATIN CALCIUM 40 MG/1
TABLET, FILM COATED ORAL
Qty: 90 TABLET | Refills: 1 | Status: SHIPPED | OUTPATIENT
Start: 2019-10-09

## 2019-10-09 RX ORDER — FUROSEMIDE 40 MG/1
TABLET ORAL
Qty: 90 TABLET | Refills: 1 | Status: SHIPPED | OUTPATIENT
Start: 2019-10-09

## 2019-10-16 DIAGNOSIS — K21.00 REFLUX ESOPHAGITIS: ICD-10-CM

## 2019-10-16 RX ORDER — NITROGLYCERIN 40 MG/1
PATCH TRANSDERMAL
Qty: 90 PATCH | Refills: 0 | Status: SHIPPED | OUTPATIENT
Start: 2019-10-16 | End: 2019-11-08 | Stop reason: SDUPTHER

## 2019-10-16 RX ORDER — PANTOPRAZOLE SODIUM 40 MG/1
40 TABLET, DELAYED RELEASE ORAL DAILY
Qty: 90 TABLET | Refills: 0 | Status: SHIPPED | OUTPATIENT
Start: 2019-10-16

## 2019-10-16 NOTE — TELEPHONE ENCOUNTER
----- Message from Homero Raphael sent at 10/16/2019  1:46 PM CDT -----  Type:  RX Refill Request    Who Called:  Patient  Refill or New Rx:  Refill  RX Name and Strength:  pantoprazole (PROTONIX) 40 MG tablet , 1XDay, 90  nitroGLYCERIN 0.2 mg/hr TD PT24 (NITRODUR) 0.2 mg/hr. 1XDay, 90    Preferred Pharmacy with phone number:      [facility] - [address]  [address]  [facility] [address]  Phone: [phone] Fax: [phone]    Local or Mail Order:  Mail Order  Ordering Provider:  Mesfin Velasquez Call Back Number:  [phone]  Additional Information:

## 2019-11-08 RX ORDER — NITROGLYCERIN 40 MG/1
PATCH TRANSDERMAL
Qty: 90 PATCH | Refills: 0 | Status: SHIPPED | OUTPATIENT
Start: 2019-11-08 | End: 2019-11-13 | Stop reason: SDUPTHER

## 2019-11-08 NOTE — TELEPHONE ENCOUNTER
----- Message from Lenora Del Toro sent at 11/8/2019  3:15 PM CST -----  Type:  RX Refill Request - patient has been out of medication for 4 days/please call pharmacy as soon as possible    Who Called:  patient  Refill or New Rx: Refill  RX Name and Strength   Nitrogylcerin patches 0.2mg  How is the patient currently taking it? (ex. 1XDay):  na  Is this a 30 day or 90 day RX: na  Preferred Pharmacy with phone number:   [facility]/pharmacy #[id number] [address] [address]  Phone: [phone] Fax: [phone]  Local or Mail Order:  Local  Ordering Provider:  Dr Yuri Toure  Best Call Back Number:  [phone]  Additional Information:  Please send to local pharmacy and advise patient/he has been out for 4 days

## 2019-11-13 ENCOUNTER — OFFICE VISIT (OUTPATIENT)
Dept: CARDIOLOGY | Facility: CLINIC | Age: 74
End: 2019-11-13
Payer: MEDICARE

## 2019-11-13 VITALS
DIASTOLIC BLOOD PRESSURE: 66 MMHG | SYSTOLIC BLOOD PRESSURE: 108 MMHG | HEART RATE: 63 BPM | OXYGEN SATURATION: 99 % | WEIGHT: 192.44 LBS | HEIGHT: 68 IN | BODY MASS INDEX: 29.17 KG/M2

## 2019-11-13 DIAGNOSIS — Z79.01 LONG TERM (CURRENT) USE OF ANTICOAGULANTS: ICD-10-CM

## 2019-11-13 DIAGNOSIS — I05.9 MITRAL VALVE DISEASE: ICD-10-CM

## 2019-11-13 DIAGNOSIS — E78.00 HYPERCHOLESTEREMIA: ICD-10-CM

## 2019-11-13 DIAGNOSIS — I25.708 CORONARY ARTERY DISEASE OF BYPASS GRAFT OF NATIVE HEART WITH STABLE ANGINA PECTORIS: Primary | ICD-10-CM

## 2019-11-13 DIAGNOSIS — I50.1 LEFT HEART FAILURE: ICD-10-CM

## 2019-11-13 DIAGNOSIS — I10 ESSENTIAL HYPERTENSION: ICD-10-CM

## 2019-11-13 PROCEDURE — 99214 OFFICE O/P EST MOD 30 MIN: CPT | Mod: S$GLB,,, | Performed by: INTERNAL MEDICINE

## 2019-11-13 PROCEDURE — 99214 PR OFFICE/OUTPT VISIT, EST, LEVL IV, 30-39 MIN: ICD-10-PCS | Mod: S$GLB,,, | Performed by: INTERNAL MEDICINE

## 2019-11-13 RX ORDER — NITROGLYCERIN 40 MG/1
PATCH TRANSDERMAL
Qty: 90 PATCH | Refills: 1 | Status: SHIPPED | OUTPATIENT
Start: 2019-11-13 | End: 2020-01-03 | Stop reason: SDUPTHER

## 2019-11-13 NOTE — PROGRESS NOTES
Subjective:    Patient ID:  Kaiden Forde is a 74 y.o. male who presents for Congestive Heart Failure; Coronary Artery Disease; Hypertension; Atrial Fibrillation; and Valvular Heart Disease        HPI    ONLY LABS PT/INR, DOING OK NO CHEST PAIN, NO TIA TYPE SYMPTOMS, NO NEAR-SYNCOPE, MILD DISTANT EXERTION, NO GI BLEED,, SEE ROS  Past Medical History:   Diagnosis Date    Acute coronary syndrome     Arthritis     Atrial fibrillation     Atrial flutter     Cancer     Cardiomyopathy     Carotid artery occlusion     CHF (congestive heart failure)     Coronary artery disease     Diabetes mellitus     Encounter for blood transfusion     GERD (gastroesophageal reflux disease)     GERD (gastroesophageal reflux disease)     Heart murmur     Hyperlipidemia     Hypertension     Hypothyroidism     Legionnaire's disease     Sleep apnea     Sleep apnea with use of continuous positive airway pressure (CPAP)     Stroke     MINI    Valvular regurgitation      Past Surgical History:   Procedure Laterality Date    CARDIAC CATHETERIZATION      CAROTID ENDARTERECTOMY      CATARACT EXTRACTION EXTRACAPSULAR W/ INTRAOCULAR LENS IMPLANTATION      COLONOSCOPY N/A 1/17/2018    Procedure: COLONOSCOPY;  Surgeon: Sachin Miguel MD;  [facility];  Service: Endoscopy;  Laterality: N/A;    CORONARY ANGIOPLASTY      CORONARY ARTERY BYPASS GRAFT      CORONARY STENT PLACEMENT      EYE SURGERY      JOINT REPLACEMENT      pauline knees    KNEE SURGERY      SHOULDER SURGERY Left      Family History   Problem Relation Age of Onset    Heart disease Brother      Social History     Socioeconomic History    Marital status:      Spouse name: Not on file    Number of children: Not on file    Years of education: Not on file    Highest education level: Not on file   Occupational History    Not on file   Social Needs    Financial resource strain: Not on file    Food insecurity:     Worry: Not on file      Inability: Not on file    Transportation needs:     Medical: Not on file     Non-medical: Not on file   Tobacco Use    Smoking status: Former Smoker     Last attempt to quit: 3/11/2001     Years since quittin.6    Smokeless tobacco: Never Used   Substance and Sexual Activity    Alcohol use: No    Drug use: No    Sexual activity: Never   Lifestyle    Physical activity:     Days per week: Not on file     Minutes per session: Not on file    Stress: Not on file   Relationships    Social connections:     Talks on phone: Not on file     Gets together: Not on file     Attends [employer] service: Not on file     Active member of club or organization: Not on file     Attends meetings of clubs or organizations: Not on file     Relationship status: Not on file   Other Topics Concern    Not on file   Social History Narrative    Not on file       Review of patient's allergies indicates:   Allergen Reactions    Oxycodone Rash       Current Outpatient Medications:     atorvastatin (LIPITOR) 40 MG tablet, TAKE 1 TABLET BY MOUTH  EVERY EVENING, Disp: 90 tablet, Rfl: 1    benazepril (LOTENSIN) 10 MG tablet, TAKE 1 TABLET BY MOUTH  EVERY EVENING, Disp: 90 tablet, Rfl: 1    carvedilol (COREG) 12.5 MG tablet, Take 1 tablet (12.5 mg total) by mouth 2 (two) times daily., Disp: 180 tablet, Rfl: 1    ferrous sulfate 324 mg (65 mg iron) TbEC, Take 1 tablet (324 mg total) by mouth 2 (two) times daily., Disp: 60 tablet, Rfl: 1    furosemide (LASIX) 40 MG tablet, TAKE 1 TABLET BY MOUTH ONCE DAILY, Disp: 90 tablet, Rfl: 1    levothyroxine (SYNTHROID) 50 MCG tablet, TAKE 1 TABLET BY MOUTH ONCE DAILY, Disp: 90 tablet, Rfl: 3    metFORMIN (GLUCOPHAGE) 1000 MG tablet, TAKE 1 TABLET BY MOUTH TWO  TIMES DAILY, Disp: 180 tablet, Rfl: 3    mv-min-folic acid-lutein (CENTRUM SILVER) 400-250 mcg Chew, Take 1 tablet by mouth once daily at 6am. , Disp: , Rfl:     nitroGLYCERIN (NITROSTAT) 0.4 MG SL tablet, DISSOLVE 1 TABLET UNDER  THE TONGUE EVERY 5 MINUTES FOR  CHEST PAIN UP TO 3 TABLETS  IN 15 MINUTES CALL 911 IF  CHEST PAIN PERSISTS, Disp: 25 tablet, Rfl: 3    nitroGLYCERIN 0.2 mg/hr TD PT24 (NITRODUR) 0.2 mg/hr, APPLY 1 PATCH TO SKIN 1  TIME DAILY,  REMOVE AFTER  12 HOURS, Disp: 90 patch, Rfl: 1    pantoprazole (PROTONIX) 40 MG tablet, Take 1 tablet (40 mg total) by mouth once daily., Disp: 90 tablet, Rfl: 0    potassium chloride (KLOR-CON) 10 MEQ TbSR, Take 1 tablet (10 mEq total) by mouth every other day., Disp: 45 tablet, Rfl: 3    warfarin (COUMADIN) 2.5 MG tablet, Take 1-1.5 tablets (2.5-3.75 mg total) by mouth Daily. This medication is managed by [facility]. Please contact [phone]. (Patient taking differently: Take 2.5-3.75 mg by mouth Daily. Current dose:  Take 3.75 mg every Tue, Thu, Sat; 2.5 mg all other days or as directed by coumadin clinic), Disp: 120 tablet, Rfl: 3    acetaminophen (TYLENOL) 325 MG tablet, Take 2 tablets (650 mg total) by mouth every 6 (six) hours as needed. (Patient not taking: Reported on 11/13/2019), Disp: , Rfl: 0    Review of Systems   Constitution: Negative for chills, decreased appetite, diaphoresis, fever, malaise/fatigue (BETTER) and night sweats. Weight loss: SOME.   HENT: Negative for congestion and nosebleeds.    Eyes: Negative for blurred vision and visual disturbance.   Cardiovascular: Negative for chest pain, claudication, cyanosis, dyspnea on exertion (MILD, ), irregular heartbeat (NOT FELT), leg swelling, near-syncope, orthopnea, palpitations, paroxysmal nocturnal dyspnea and syncope.   Respiratory: Negative for cough, hemoptysis, shortness of breath (SOME) and wheezing.    Endocrine: Negative for cold intolerance, heat intolerance, polydipsia, polyphagia and polyuria.   Hematologic/Lymphatic: Negative for adenopathy and bleeding problem.   Skin: Negative for color change and rash.   Musculoskeletal: Negative for back pain, falls and joint swelling.   Gastrointestinal:  "Negative for abdominal pain, change in bowel habit, diarrhea (CHRONIC,BETTER OFF LACTOSE), dysphagia, heartburn, hematemesis, hematochezia, jaundice and melena.   Genitourinary: Negative for dysuria, flank pain and frequency.   Neurological: Negative for brief paralysis, dizziness, focal weakness, light-headedness and weakness.   Psychiatric/Behavioral: Negative for altered mental status and depression. The patient is not nervous/anxious.    Allergic/Immunologic: Negative for hives and persistent infections.        Objective:      Vitals:    11/13/19 1322   BP: 108/66   Pulse: 63   SpO2: 99%   Weight: 87.3 kg (192 lb 7.4 oz)   Height: 5' 8" (1.727 m)   PainSc: 0-No pain     Body mass index is 29.26 kg/m².    Physical Exam   Constitutional: He is oriented to person, place, and time. He appears well-developed and well-nourished.   OVERWEIGHT   HENT:   Head: Normocephalic and atraumatic.   Mouth/Throat: Oropharynx is clear and moist.   Eyes: Pupils are equal, round, and reactive to light. Conjunctivae and EOM are normal.   Neck: Neck supple. Normal carotid pulses, no hepatojugular reflux and no JVD present. Carotid bruit is present. No thyromegaly present.   Cardiovascular: Normal rate. An irregular rhythm present. Exam reveals no gallop, no distant heart sounds, no friction rub and no midsystolic click.   Murmur heard.  High-pitched holosystolic murmur is present with a grade of 2/6 at the apex.   Early diastolic murmur is present with a grade of 1/6 at the upper right sternal border.  Pulses:       Carotid pulses are 2+ on the right side with bruit, and 2+ on the left side with bruit.       Radial pulses are 2+ on the right side, and 2+ on the left side.        Femoral pulses are 2+ on the right side, and 2+ on the left side.       Posterior tibial pulses are 2+ on the right side, and 2+ on the left side.   Pulmonary/Chest: Effort normal and breath sounds normal. No respiratory distress. He has no wheezes. He has no " rales.   STERNOTOMY SCAR   Abdominal: Soft. Bowel sounds are normal. He exhibits no distension and no mass. There is no hepatosplenomegaly. There is no CVA tenderness.   Musculoskeletal: Normal range of motion. He exhibits no edema.   TRACE EDEMA   Lymphadenopathy:     He has no cervical adenopathy.   Neurological: He is alert and oriented to person, place, and time. He displays no tremor. No cranial nerve deficit.   Skin: Skin is warm and dry. No bruising noted. No cyanosis or erythema.   Psychiatric: He has a normal mood and affect. His speech is normal and behavior is normal. Cognition and memory are normal.               ..    Chemistry        Component Value Date/Time     (L) 12/05/2018 1121    K 4.7 12/05/2018 1121    CL 98 12/05/2018 1121    CO2 28 12/05/2018 1121    BUN 40 (H) 12/05/2018 1121    CREATININE 1.35 12/05/2018 1121    GLU 97 12/05/2018 1121        Component Value Date/Time    CALCIUM 8.8 12/05/2018 1121    ALKPHOS 85 12/05/2018 1121    AST 31 12/05/2018 1121    ALT 34 12/05/2018 1121    BILITOT 0.9 12/05/2018 1121    ESTGFRAFRICA 59 (A) 12/05/2018 1121    EGFRNONAA 51 (A) 12/05/2018 1121            ..  Lab Results   Component Value Date    CHOL 124 06/05/2018    CHOL 102 (L) 06/26/2017     Lab Results   Component Value Date    HDL 55 06/05/2018    HDL 66 06/26/2017     Lab Results   Component Value Date    LDLCALC 57.8 (L) 06/05/2018    LDLCALC 27.6 (L) 06/26/2017     Lab Results   Component Value Date    TRIG 56 06/05/2018    TRIG 42 06/26/2017     Lab Results   Component Value Date    CHOLHDL 44.4 06/05/2018    CHOLHDL 64.7 (H) 06/26/2017     ..  Lab Results   Component Value Date    WBC 4.68 06/26/2018    HGB 10.4 (L) 06/26/2018    HCT 30.2 (L) 06/26/2018    MCV 86 06/26/2018     (L) 06/26/2018       Test(s) Reviewed  I have reviewed the following in detail:  [] Stress test   [] Angiography   [] Echocardiogram   [x] Labs   [x] Other:       Assessment:         ICD-10-CM ICD-9-CM    1. Coronary artery disease of bypass graft of native heart with stable angina pectoris I25.708 414.05     413.9   2. Left heart failure I50.1 428.1   3. Long term (current) use of anticoagulants Z79.01 V58.61   4. Essential hypertension I10 401.9   5. Hypercholesteremia E78.00 272.0   6. Mitral valve disease I05.9 394.9     Problem List Items Addressed This Visit        Cardiac/Vascular    Hypertension    Relevant Orders    Comprehensive metabolic panel    Coronary artery disease of bypass graft of native heart with stable angina pectoris - Primary    Relevant Orders    Comprehensive metabolic panel    Lipid panel    Echo    Left heart failure    Relevant Orders    Comprehensive metabolic panel    Echo    Hypercholesteremia    Relevant Orders    Comprehensive metabolic panel    Lipid panel    Mitral valve disease    Relevant Orders    Echo       Hematology    Long term (current) use of anticoagulants    Relevant Orders    Comprehensive metabolic panel    Hemoglobin           Plan:     DAILY WEIGHT, EXPLAINED 2 LB PER DAY OR 5 LB PER WEEK RULE, CHECK ECHO, REASSESS EJECTION FRACTION AND VALVULAR DISEASE,ALL OTHER CV CLINICALLY STABLE, NO ANGINA, CLASS 2 HF, NO TIA, NO CLINICAL ARRHYTHMIA,CONTINUE CURRENT MEDS, EDUCATION, DIET, EXERCISE, RETURN TO CLINIC IN 6 MO WITH LABS      Coronary artery disease of bypass graft of native heart with stable angina pectoris  -     Comprehensive metabolic panel; Future; Expected date: 11/13/2019  -     Lipid panel; Future; Expected date: 11/13/2019  -     Echo; Future    Left heart failure  -     Comprehensive metabolic panel; Future; Expected date: 11/13/2019  -     Echo; Future    Long term (current) use of anticoagulants  -     Comprehensive metabolic panel; Future; Expected date: 11/13/2019  -     Hemoglobin; Future; Expected date: 11/13/2019    Essential hypertension  -     Comprehensive metabolic panel; Future; Expected date: 11/13/2019    Hypercholesteremia  -      Comprehensive metabolic panel; Future; Expected date: 11/13/2019  -     Lipid panel; Future; Expected date: 11/13/2019    Mitral valve disease  -     Echo; Future    Other orders  -     nitroGLYCERIN 0.2 mg/hr TD PT24 (NITRODUR) 0.2 mg/hr; APPLY 1 PATCH TO SKIN 1  TIME DAILY,  REMOVE AFTER  12 HOURS  Dispense: 90 patch; Refill: 1    RTC Low level/low impact aerobic exercise 5x's/wk. Heart healthy diet and risk factor modification.    See labs and med orders.    Aerobic exercise 5x's/wk. Heart healthy diet and risk factor modification.    See labs and med orders.

## 2019-11-21 ENCOUNTER — CLINICAL SUPPORT (OUTPATIENT)
Dept: CARDIOLOGY | Facility: CLINIC | Age: 74
End: 2019-11-21
Attending: INTERNAL MEDICINE
Payer: MEDICARE

## 2019-11-21 VITALS
HEART RATE: 80 BPM | BODY MASS INDEX: 29.17 KG/M2 | HEIGHT: 68 IN | WEIGHT: 192.44 LBS | SYSTOLIC BLOOD PRESSURE: 115 MMHG | DIASTOLIC BLOOD PRESSURE: 60 MMHG

## 2019-11-21 DIAGNOSIS — I05.9 MITRAL VALVE DISEASE: ICD-10-CM

## 2019-11-21 DIAGNOSIS — I50.1 LEFT HEART FAILURE: ICD-10-CM

## 2019-11-21 DIAGNOSIS — I25.708 CORONARY ARTERY DISEASE OF BYPASS GRAFT OF NATIVE HEART WITH STABLE ANGINA PECTORIS: ICD-10-CM

## 2019-11-21 LAB
ASCENDING AORTA: 3.03 CM
AV INDEX (PROSTH): 0.82
AV MEAN GRADIENT: 2 MMHG
AV PEAK GRADIENT: 5 MMHG
AV VALVE AREA: 2.54 CM2
AV VELOCITY RATIO: 0.76
BSA FOR ECHO PROCEDURE: 2.05 M2
CV ECHO LV RWT: 0.3 CM
DOP CALC AO PEAK VEL: 1.07 M/S
DOP CALC AO VTI: 20.42 CM
DOP CALC LVOT AREA: 3.1 CM2
DOP CALC LVOT DIAMETER: 1.99 CM
DOP CALC LVOT PEAK VEL: 0.81 M/S
DOP CALC LVOT STROKE VOLUME: 51.82 CM3
DOP CALCLVOT PEAK VEL VTI: 16.67 CM
E WAVE DECELERATION TIME: 135.33 MSEC
E/A RATIO: 3.35
E/E' RATIO: 10.27 M/S
ECHO LV POSTERIOR WALL: 0.82 CM (ref 0.6–1.1)
FRACTIONAL SHORTENING: 16 % (ref 28–44)
INTERVENTRICULAR SEPTUM: 0.95 CM (ref 0.6–1.1)
IVRT: 0.19 MSEC
LA MAJOR: 8.39 CM
LA MINOR: 6.38 CM
LA WIDTH: 3.93 CM
LEFT ATRIUM SIZE: 5.4 CM
LEFT ATRIUM VOLUME INDEX: 65 ML/M2
LEFT ATRIUM VOLUME: 130.75 CM3
LEFT INTERNAL DIMENSION IN SYSTOLE: 4.54 CM (ref 2.1–4)
LEFT VENTRICLE DIASTOLIC VOLUME INDEX: 70.05 ML/M2
LEFT VENTRICLE DIASTOLIC VOLUME: 140.86 ML
LEFT VENTRICLE MASS INDEX: 87 G/M2
LEFT VENTRICLE SYSTOLIC VOLUME INDEX: 47.1 ML/M2
LEFT VENTRICLE SYSTOLIC VOLUME: 94.63 ML
LEFT VENTRICULAR INTERNAL DIMENSION IN DIASTOLE: 5.39 CM (ref 3.5–6)
LEFT VENTRICULAR MASS: 175.71 G
LV LATERAL E/E' RATIO: 8.56 M/S
LV SEPTAL E/E' RATIO: 12.83 M/S
MV PEAK A VEL: 0.23 M/S
MV PEAK E VEL: 0.77 M/S
PISA TR MAX VEL: 2.81 M/S
PULM VEIN S/D RATIO: 0.59
PV PEAK D VEL: 0.64 M/S
PV PEAK S VEL: 0.38 M/S
PV PEAK VELOCITY: 0.79 CM/S
RA MAJOR: 7.52 CM
RA PRESSURE: 8 MMHG
RA WIDTH: 8.36 CM
RIGHT VENTRICULAR END-DIASTOLIC DIMENSION: 4.64 CM
SINUS: 2.76 CM
STJ: 2.82 CM
TDI LATERAL: 0.09 M/S
TDI SEPTAL: 0.06 M/S
TDI: 0.08 M/S
TR MAX PG: 32 MMHG
TRICUSPID ANNULAR PLANE SYSTOLIC EXCURSION: 0.94 CM
TV REST PULMONARY ARTERY PRESSURE: 40 MMHG

## 2019-11-21 PROCEDURE — 93306 ECHO (CUPID ONLY): ICD-10-PCS | Mod: S$GLB,,, | Performed by: INTERNAL MEDICINE

## 2019-11-21 PROCEDURE — 93306 TTE W/DOPPLER COMPLETE: CPT | Mod: S$GLB,,, | Performed by: INTERNAL MEDICINE

## 2019-12-15 ENCOUNTER — CLINICAL SUPPORT (OUTPATIENT)
Dept: CARDIOLOGY | Facility: CLINIC | Age: 74
End: 2019-12-15
Payer: MEDICARE

## 2019-12-15 PROCEDURE — 93297 CARDIAC DEVICE CHECK - REMOTE: ICD-10-PCS | Mod: ,,, | Performed by: INTERNAL MEDICINE

## 2019-12-15 PROCEDURE — 93297 REM INTERROG DEV EVAL ICPMS: CPT | Mod: ,,, | Performed by: INTERNAL MEDICINE

## 2019-12-19 DIAGNOSIS — I50.1 LEFT HEART FAILURE: ICD-10-CM

## 2019-12-19 RX ORDER — CARVEDILOL 12.5 MG/1
TABLET ORAL
Qty: 180 TABLET | Refills: 1 | Status: SHIPPED | OUTPATIENT
Start: 2019-12-19

## 2020-01-03 RX ORDER — NITROGLYCERIN 40 MG/1
PATCH TRANSDERMAL
Qty: 90 PATCH | Refills: 1 | Status: SHIPPED | OUTPATIENT
Start: 2020-01-03

## 2020-01-03 NOTE — TELEPHONE ENCOUNTER
----- Message from Aimee Tongza sent at 1/3/2020  9:27 AM CST -----  Type:  RX Refill Request    Who Called:  Kaiden  Refill or New Rx:  refill  RX Name and Strength:  nitroGLYCERIN 0.2 mg/hr TD PT24 (NITRODUR) 0.2 mg/hr  How is the patient currently taking it? (ex. 1XDay):  Once daily  Is this a 30 day or 90 day RX:  90  Preferred Pharmacy with phone number:    [facility]/pharmacy #[id number] [address] [address]  Phone: [phone] Fax: [phone]  Local or Mail Order:  local  Ordering Provider:  Dr Mesfin Velasquez Call Back Number:  [phone] or [phone]  Additional Information:  [facility] no longer stocks it, so [facility] needs a new prescription for it.  Thank you!

## 2020-01-14 ENCOUNTER — CLINICAL SUPPORT (OUTPATIENT)
Dept: CARDIOLOGY | Facility: CLINIC | Age: 75
End: 2020-01-14
Payer: MEDICARE

## 2020-01-14 PROCEDURE — 93297 REM INTERROG DEV EVAL ICPMS: CPT | Mod: ,,, | Performed by: INTERNAL MEDICINE

## 2020-01-14 PROCEDURE — G2066 INTER DEVC REMOTE 30D: HCPCS | Mod: PBBFAC,PO | Performed by: INTERNAL MEDICINE

## 2020-01-14 PROCEDURE — 93297 CARDIAC DEVICE CHECK - REMOTE: ICD-10-PCS | Mod: ,,, | Performed by: INTERNAL MEDICINE

## 2020-01-24 ENCOUNTER — TELEPHONE (OUTPATIENT)
Dept: CARDIOLOGY | Facility: CLINIC | Age: 75
End: 2020-01-24

## 2020-01-24 DIAGNOSIS — Z95.810 CARDIAC DEFIBRILLATOR IN SITU: ICD-10-CM

## 2020-01-24 DIAGNOSIS — I48.91 ATRIAL FIBRILLATION, UNSPECIFIED TYPE: Primary | ICD-10-CM

## 2020-01-24 NOTE — TELEPHONE ENCOUNTER
Received [employer] alert below:    REMOTE Interrogation Report: Interim report alert for VT.  20  Presenting egram demonstrates:  Device fxn WNL:  [employer] algorithms are   RV pacing  0%  Atrial arrhythmias: AF burden 22.4%  Anticoagulation status:   Ventricular arrhythmias: Pt had 5 rounds of ATP with the 5th being successful.   TI trending downward since early 2019.   *EScalating for review of VT with successful ATP.   Report prepared by MK Mckee    Attempted multiple times to call pt., messages left on home phone and attempted cell phone but no voicemail set up. Reviewed with Dr. Toure and per Dr. Toure have pt.seen in clinic and order a BMP and Mag. Orders placed.    1300- called pt. contact Winston(friend), per Winston Mr. Forde was found  this morning.    Dr. Toure notified

## 2025-07-21 NOTE — TELEPHONE ENCOUNTER
Discharge instructions and prescriptions were reviewed and the patient was instructed to follow up either back in the ED or at the walk in clinic. The patient voiced understanding. The patient reports that he does not have a PCP.       Spoke to pt; advised to contact PCP regarding ongoing diarrhea after stopping protonix; pt expressed understanding